# Patient Record
Sex: FEMALE | Race: WHITE | NOT HISPANIC OR LATINO
[De-identification: names, ages, dates, MRNs, and addresses within clinical notes are randomized per-mention and may not be internally consistent; named-entity substitution may affect disease eponyms.]

---

## 2017-03-27 ENCOUNTER — APPOINTMENT (OUTPATIENT)
Dept: INTERNAL MEDICINE | Facility: CLINIC | Age: 57
End: 2017-03-27

## 2017-04-13 ENCOUNTER — APPOINTMENT (OUTPATIENT)
Dept: INTERNAL MEDICINE | Facility: CLINIC | Age: 57
End: 2017-04-13

## 2017-04-13 VITALS
BODY MASS INDEX: 37.69 KG/M2 | DIASTOLIC BLOOD PRESSURE: 82 MMHG | RESPIRATION RATE: 18 BRPM | SYSTOLIC BLOOD PRESSURE: 100 MMHG | WEIGHT: 192 LBS | HEART RATE: 88 BPM | HEIGHT: 60 IN | OXYGEN SATURATION: 95 % | TEMPERATURE: 98 F

## 2017-04-13 RX ORDER — AZITHROMYCIN 250 MG/1
250 TABLET, FILM COATED ORAL
Qty: 6 | Refills: 0 | Status: DISCONTINUED | COMMUNITY
Start: 2016-11-15

## 2017-04-25 ENCOUNTER — MEDICATION RENEWAL (OUTPATIENT)
Age: 57
End: 2017-04-25

## 2017-05-15 ENCOUNTER — RX RENEWAL (OUTPATIENT)
Age: 57
End: 2017-05-15

## 2017-05-23 ENCOUNTER — MEDICATION RENEWAL (OUTPATIENT)
Age: 57
End: 2017-05-23

## 2017-05-23 DIAGNOSIS — M54.5 LOW BACK PAIN: ICD-10-CM

## 2017-07-03 ENCOUNTER — RX RENEWAL (OUTPATIENT)
Age: 57
End: 2017-07-03

## 2017-07-06 ENCOUNTER — MEDICATION RENEWAL (OUTPATIENT)
Age: 57
End: 2017-07-06

## 2017-08-08 ENCOUNTER — RX RENEWAL (OUTPATIENT)
Age: 57
End: 2017-08-08

## 2017-08-21 ENCOUNTER — APPOINTMENT (OUTPATIENT)
Dept: MAMMOGRAPHY | Facility: HOSPITAL | Age: 57
End: 2017-08-21
Payer: MEDICAID

## 2017-08-21 ENCOUNTER — OUTPATIENT (OUTPATIENT)
Dept: OUTPATIENT SERVICES | Facility: HOSPITAL | Age: 57
LOS: 1 days | End: 2017-08-21
Payer: MEDICAID

## 2017-08-21 PROCEDURE — 77067 SCR MAMMO BI INCL CAD: CPT

## 2017-08-21 PROCEDURE — 77063 BREAST TOMOSYNTHESIS BI: CPT | Mod: 26

## 2017-08-21 PROCEDURE — G0202: CPT | Mod: 26

## 2017-08-21 PROCEDURE — 77063 BREAST TOMOSYNTHESIS BI: CPT

## 2017-08-22 ENCOUNTER — RECORD ABSTRACTING (OUTPATIENT)
Age: 57
End: 2017-08-22

## 2017-08-22 DIAGNOSIS — M50.90 CERVICAL DISC DISORDER, UNSPECIFIED, UNSPECIFIED CERVICAL REGION: ICD-10-CM

## 2017-08-22 DIAGNOSIS — F32.9 MAJOR DEPRESSIVE DISORDER, SINGLE EPISODE, UNSPECIFIED: ICD-10-CM

## 2017-08-22 DIAGNOSIS — D56.1 BETA THALASSEMIA: ICD-10-CM

## 2017-08-22 DIAGNOSIS — Z91.89 OTHER SPECIFIED PERSONAL RISK FACTORS, NOT ELSEWHERE CLASSIFIED: ICD-10-CM

## 2017-08-22 DIAGNOSIS — G47.33 OBSTRUCTIVE SLEEP APNEA (ADULT) (PEDIATRIC): ICD-10-CM

## 2017-08-22 DIAGNOSIS — R60.0 LOCALIZED EDEMA: ICD-10-CM

## 2017-08-22 DIAGNOSIS — M54.5 LOW BACK PAIN: ICD-10-CM

## 2017-08-22 DIAGNOSIS — D50.9 IRON DEFICIENCY ANEMIA, UNSPECIFIED: ICD-10-CM

## 2017-08-24 ENCOUNTER — APPOINTMENT (OUTPATIENT)
Dept: INTERNAL MEDICINE | Facility: CLINIC | Age: 57
End: 2017-08-24
Payer: MEDICAID

## 2017-08-24 VITALS
RESPIRATION RATE: 18 BRPM | OXYGEN SATURATION: 97 % | SYSTOLIC BLOOD PRESSURE: 108 MMHG | HEART RATE: 90 BPM | WEIGHT: 192 LBS | HEIGHT: 60 IN | DIASTOLIC BLOOD PRESSURE: 62 MMHG | TEMPERATURE: 98.1 F | BODY MASS INDEX: 37.69 KG/M2

## 2017-08-24 DIAGNOSIS — Z80.1 FAMILY HISTORY OF MALIGNANT NEOPLASM OF TRACHEA, BRONCHUS AND LUNG: ICD-10-CM

## 2017-08-24 DIAGNOSIS — Z82.0 FAMILY HISTORY OF EPILEPSY AND OTHER DISEASES OF THE NERVOUS SYSTEM: ICD-10-CM

## 2017-08-24 DIAGNOSIS — Z83.3 FAMILY HISTORY OF DIABETES MELLITUS: ICD-10-CM

## 2017-08-24 PROCEDURE — 99213 OFFICE O/P EST LOW 20 MIN: CPT

## 2017-08-24 RX ORDER — TIOTROPIUM BROMIDE INHALATION SPRAY 1.56 UG/1
1.25 SPRAY, METERED RESPIRATORY (INHALATION)
Refills: 0 | Status: DISCONTINUED | COMMUNITY
End: 2017-08-24

## 2017-08-31 ENCOUNTER — RX RENEWAL (OUTPATIENT)
Age: 57
End: 2017-08-31

## 2017-09-26 ENCOUNTER — APPOINTMENT (OUTPATIENT)
Dept: INTERNAL MEDICINE | Facility: CLINIC | Age: 57
End: 2017-09-26
Payer: COMMERCIAL

## 2017-09-26 VITALS
WEIGHT: 194 LBS | DIASTOLIC BLOOD PRESSURE: 76 MMHG | SYSTOLIC BLOOD PRESSURE: 102 MMHG | OXYGEN SATURATION: 97 % | RESPIRATION RATE: 18 BRPM | BODY MASS INDEX: 38.09 KG/M2 | HEIGHT: 60 IN | HEART RATE: 110 BPM | TEMPERATURE: 97.8 F

## 2017-09-26 PROCEDURE — 99396 PREV VISIT EST AGE 40-64: CPT

## 2017-09-26 RX ORDER — ATORVASTATIN CALCIUM 10 MG/1
10 TABLET, FILM COATED ORAL DAILY
Qty: 90 | Refills: 1 | Status: DISCONTINUED | COMMUNITY
Start: 2017-04-13 | End: 2017-09-26

## 2017-09-28 ENCOUNTER — RX RENEWAL (OUTPATIENT)
Age: 57
End: 2017-09-28

## 2017-10-13 ENCOUNTER — APPOINTMENT (OUTPATIENT)
Dept: INTERNAL MEDICINE | Facility: CLINIC | Age: 57
End: 2017-10-13
Payer: COMMERCIAL

## 2017-10-13 VITALS
SYSTOLIC BLOOD PRESSURE: 112 MMHG | DIASTOLIC BLOOD PRESSURE: 64 MMHG | OXYGEN SATURATION: 97 % | TEMPERATURE: 98.1 F | BODY MASS INDEX: 38.48 KG/M2 | HEIGHT: 60 IN | RESPIRATION RATE: 18 BRPM | HEART RATE: 107 BPM | WEIGHT: 196 LBS

## 2017-10-13 DIAGNOSIS — H92.09 OTALGIA, UNSPECIFIED EAR: ICD-10-CM

## 2017-10-13 DIAGNOSIS — Z88.9 ALLERGY STATUS TO UNSPECIFIED DRUGS, MEDICAMENTS AND BIOLOGICAL SUBSTANCES: ICD-10-CM

## 2017-10-13 PROCEDURE — 99213 OFFICE O/P EST LOW 20 MIN: CPT

## 2017-10-19 ENCOUNTER — RX RENEWAL (OUTPATIENT)
Age: 57
End: 2017-10-19

## 2017-10-27 ENCOUNTER — MEDICATION RENEWAL (OUTPATIENT)
Age: 57
End: 2017-10-27

## 2018-01-08 ENCOUNTER — RX RENEWAL (OUTPATIENT)
Age: 58
End: 2018-01-08

## 2018-01-12 ENCOUNTER — MEDICATION RENEWAL (OUTPATIENT)
Age: 58
End: 2018-01-12

## 2018-01-18 ENCOUNTER — RX RENEWAL (OUTPATIENT)
Age: 58
End: 2018-01-18

## 2018-02-15 ENCOUNTER — RX RENEWAL (OUTPATIENT)
Age: 58
End: 2018-02-15

## 2018-02-15 ENCOUNTER — APPOINTMENT (OUTPATIENT)
Dept: INTERNAL MEDICINE | Facility: CLINIC | Age: 58
End: 2018-02-15
Payer: COMMERCIAL

## 2018-02-15 ENCOUNTER — RESULT CHARGE (OUTPATIENT)
Age: 58
End: 2018-02-15

## 2018-02-15 VITALS
SYSTOLIC BLOOD PRESSURE: 130 MMHG | HEIGHT: 60 IN | OXYGEN SATURATION: 98 % | HEART RATE: 90 BPM | WEIGHT: 181.99 LBS | DIASTOLIC BLOOD PRESSURE: 80 MMHG | RESPIRATION RATE: 16 BRPM | TEMPERATURE: 98.7 F | BODY MASS INDEX: 35.73 KG/M2

## 2018-02-15 DIAGNOSIS — H60.91 UNSPECIFIED OTITIS EXTERNA, RIGHT EAR: ICD-10-CM

## 2018-02-15 DIAGNOSIS — H92.01 OTALGIA, RIGHT EAR: ICD-10-CM

## 2018-02-15 LAB — GLUCOSE BLDC GLUCOMTR-MCNC: 132

## 2018-02-15 PROCEDURE — 99214 OFFICE O/P EST MOD 30 MIN: CPT | Mod: 25

## 2018-02-15 PROCEDURE — 82962 GLUCOSE BLOOD TEST: CPT

## 2018-02-15 RX ORDER — METHYLPREDNISOLONE 4 MG/1
4 TABLET ORAL
Qty: 1 | Refills: 2 | Status: DISCONTINUED | COMMUNITY
Start: 2017-10-13 | End: 2018-02-15

## 2018-02-15 RX ORDER — LEVOFLOXACIN 500 MG/1
500 TABLET, FILM COATED ORAL DAILY
Qty: 7 | Refills: 0 | Status: DISCONTINUED | COMMUNITY
Start: 2017-10-13 | End: 2018-02-15

## 2018-02-15 RX ORDER — NEOMYCIN SULFATE, POLYMYXIN B SULFATE AND HYDROCORTISONE 3.5; 10000; 1 MG/ML; [IU]/ML; MG/ML
3.5-10000-1 SOLUTION AURICULAR (OTIC)
Qty: 1 | Refills: 1 | Status: DISCONTINUED | COMMUNITY
Start: 2017-10-13 | End: 2018-02-15

## 2018-02-15 RX ORDER — RABEPRAZOLE SODIUM 20 MG/1
20 TABLET, DELAYED RELEASE ORAL
Qty: 90 | Refills: 1 | Status: COMPLETED | COMMUNITY
Start: 2016-10-31 | End: 2018-02-15

## 2018-02-16 ENCOUNTER — MEDICATION RENEWAL (OUTPATIENT)
Age: 58
End: 2018-02-16

## 2018-02-16 RX ORDER — RABEPRAZOLE SODIUM 20 MG/1
20 TABLET, DELAYED RELEASE ORAL
Qty: 90 | Refills: 1 | Status: COMPLETED | COMMUNITY
Start: 2018-02-15 | End: 2018-02-16

## 2018-02-21 ENCOUNTER — MEDICATION RENEWAL (OUTPATIENT)
Age: 58
End: 2018-02-21

## 2018-02-21 RX ORDER — LANSOPRAZOLE 30 MG/1
30 CAPSULE, DELAYED RELEASE ORAL
Qty: 30 | Refills: 5 | Status: COMPLETED | COMMUNITY
Start: 2017-09-28 | End: 2018-02-21

## 2018-03-26 ENCOUNTER — CLINICAL ADVICE (OUTPATIENT)
Age: 58
End: 2018-03-26

## 2018-05-23 ENCOUNTER — RX RENEWAL (OUTPATIENT)
Age: 58
End: 2018-05-23

## 2018-06-04 ENCOUNTER — RX RENEWAL (OUTPATIENT)
Age: 58
End: 2018-06-04

## 2018-08-06 ENCOUNTER — APPOINTMENT (OUTPATIENT)
Dept: INTERNAL MEDICINE | Facility: CLINIC | Age: 58
End: 2018-08-06
Payer: COMMERCIAL

## 2018-08-06 VITALS
OXYGEN SATURATION: 96 % | BODY MASS INDEX: 35.93 KG/M2 | HEIGHT: 60 IN | TEMPERATURE: 98.4 F | WEIGHT: 183 LBS | HEART RATE: 94 BPM | RESPIRATION RATE: 16 BRPM | SYSTOLIC BLOOD PRESSURE: 124 MMHG | DIASTOLIC BLOOD PRESSURE: 64 MMHG

## 2018-08-06 PROCEDURE — 94060 EVALUATION OF WHEEZING: CPT

## 2018-08-06 PROCEDURE — 94729 DIFFUSING CAPACITY: CPT

## 2018-08-06 PROCEDURE — ZZZZZ: CPT

## 2018-08-06 PROCEDURE — 99214 OFFICE O/P EST MOD 30 MIN: CPT | Mod: 25

## 2018-08-06 PROCEDURE — 94727 GAS DIL/WSHOT DETER LNG VOL: CPT

## 2018-08-06 RX ORDER — AZITHROMYCIN 250 MG/1
250 TABLET, FILM COATED ORAL
Qty: 1 | Refills: 0 | Status: DISCONTINUED | COMMUNITY
Start: 2018-03-26 | End: 2018-08-06

## 2018-08-06 NOTE — PHYSICAL EXAM
[General Appearance - Alert] : alert [General Appearance - In No Acute Distress] : in no acute distress [Outer Ear] : the ears and nose were normal in appearance [Neck Appearance] : the appearance of the neck was normal [Neck Cervical Mass (___cm)] : no neck mass was observed [Jugular Venous Distention Increased] : there was no jugular-venous distention [Thyroid Diffuse Enlargement] : the thyroid was not enlarged [Thyroid Nodule] : there were no palpable thyroid nodules [] : no respiratory distress [Heart Rate And Rhythm] : heart rate was normal and rhythm regular [Heart Sounds] : normal S1 and S2 [Heart Sounds Gallop] : no gallops [Murmurs] : no murmurs [Heart Sounds Pericardial Friction Rub] : no pericardial rub [FreeTextEntry1] : Several hyperpigmented nevi are noted

## 2018-08-06 NOTE — PLAN
[FreeTextEntry1] : Pt again advised she must reduce use of ventolin and risks of toxicity/tolerance  reiterated.  This office will not overprescribe albuterol.  She agrees to reduce her use and will restart Symbicort 160/4.5. Importance of maintenance treatment discussed.   If this is not covered we will attempt to work with her insurance Co for a preferred RX.  \par \par She again declines starting statin. CV risks associated with DM reviewed. \par \par Optho evaluation yearly.\par  \par See GYN for pap and mammo.  \par \par Suggested pt contact the [facility] as they may be able to provide her with less costly health care services.  \par \par We had an extensive discussion regarding smoking cessation lasting 5 minutes. We reviewed  the various OTC and prescription options available.  Pt education provided.  Pt was given resources to aid in smoking cessation including the [facility] quitline and [facility]. Pt states she does not wish to quit despite repeated urgings.  \par

## 2018-08-06 NOTE — DATA REVIEWED
[FreeTextEntry1] : PFT was performed today.  Lung volumes are within normal.  FLows are normal but 25-75% 67 % with mild airway reactivity.  The DLCO is normal and saturation is maintained.

## 2018-08-06 NOTE — COUNSELING
[Weight management counseling provided] : Weight management [Healthy eating counseling provided] : healthy eating [Activity counseling provided] : activity [Smoking cessation counseling provided] : smoking cessation [Decrease Portions] : Decrease food portions [___ min/wk activity recommended] : [unfilled] min/wk activity recommended [Quit Smoking] : Quit smoking [Patient Non-adherent to care plan] : Patient non-adherent to care plan [Financial issues] : Financial issues [Patient motivation] : Patient motivation [Needs reinforcement, provided] : Patient needs reinforcement on understanding lifestyle changes and  the steps needed to achieve self management goals and reinforcement was provided

## 2018-08-06 NOTE — HISTORY OF PRESENT ILLNESS
[FreeTextEntry1] : DM/Asthma FU [de-identified] : She continues to use Albuterol MDI 2 inhaler per month despite all prior warnings.    In addition she may use a Albuterol Neb tx every few days for increased  SOB and tightness.  She continues to smoke 2 ppd and states she has no desire to stop smoking.  She has a constant PND.  She stopped flonase as she ran out.  Previous use of ICS/LABAs have caused thrush in the past or "made her asthma worse".      \par She is trying to eat healthier.  She misses meals at times due to work schedule.  No weight loss.  She has not been doing any exercise as she feels she is too busy and  she gets enough exercise cleaning houses.  She did not start statin as previously recommended.  \par

## 2018-08-06 NOTE — REVIEW OF SYSTEMS
[Postnasal Drip] : postnasal drip [Shortness Of Breath] : shortness of breath [Wheezing] : wheezing [Negative] : Musculoskeletal

## 2018-08-16 ENCOUNTER — RX RENEWAL (OUTPATIENT)
Age: 58
End: 2018-08-16

## 2018-08-16 RX ORDER — LANCETS
EACH MISCELLANEOUS
Qty: 90 | Refills: 0 | Status: ACTIVE | COMMUNITY
Start: 2018-08-16 | End: 1900-01-01

## 2018-08-16 RX ORDER — BLOOD SUGAR DIAGNOSTIC
STRIP MISCELLANEOUS
Qty: 90 | Refills: 3 | Status: ACTIVE | COMMUNITY
Start: 2018-08-16 | End: 1900-01-01

## 2018-08-20 ENCOUNTER — RX RENEWAL (OUTPATIENT)
Age: 58
End: 2018-08-20

## 2018-09-10 ENCOUNTER — RX RENEWAL (OUTPATIENT)
Age: 58
End: 2018-09-10

## 2018-09-18 ENCOUNTER — RX RENEWAL (OUTPATIENT)
Age: 58
End: 2018-09-18

## 2018-10-31 LAB
CHOLEST SERPL-MCNC: 199
GLUCOSE BS SERPL-MCNC: 152
HBA1C MFR BLD HPLC: 7.4
HDLC SERPL-MCNC: 45
LIPID PNL WITH DIRECT LDL SERPL: 126
TRIGL SERPL-MCNC: 142

## 2018-11-21 ENCOUNTER — RX RENEWAL (OUTPATIENT)
Age: 58
End: 2018-11-21

## 2018-11-28 ENCOUNTER — RX RENEWAL (OUTPATIENT)
Age: 58
End: 2018-11-28

## 2019-01-04 ENCOUNTER — RX RENEWAL (OUTPATIENT)
Age: 59
End: 2019-01-04

## 2019-01-23 LAB — HBA1C MFR BLD HPLC: 7.3

## 2019-01-28 ENCOUNTER — APPOINTMENT (OUTPATIENT)
Dept: INTERNAL MEDICINE | Facility: CLINIC | Age: 59
End: 2019-01-28
Payer: COMMERCIAL

## 2019-01-28 VITALS
SYSTOLIC BLOOD PRESSURE: 130 MMHG | DIASTOLIC BLOOD PRESSURE: 90 MMHG | HEART RATE: 100 BPM | WEIGHT: 183 LBS | OXYGEN SATURATION: 96 % | BODY MASS INDEX: 35.93 KG/M2 | RESPIRATION RATE: 18 BRPM | TEMPERATURE: 98.7 F | HEIGHT: 60 IN

## 2019-01-28 VITALS — SYSTOLIC BLOOD PRESSURE: 124 MMHG | DIASTOLIC BLOOD PRESSURE: 78 MMHG

## 2019-01-28 PROCEDURE — 99396 PREV VISIT EST AGE 40-64: CPT

## 2019-01-28 PROCEDURE — 99406 BEHAV CHNG SMOKING 3-10 MIN: CPT

## 2019-01-28 PROCEDURE — 99386 PREV VISIT NEW AGE 40-64: CPT

## 2019-01-28 RX ORDER — MELATONIN 3 MG
3 CAPSULE ORAL
Refills: 0 | Status: ACTIVE | COMMUNITY
Start: 2019-01-28

## 2019-01-28 NOTE — ASSESSMENT
[FreeTextEntry1] : \par Repeat BMP to recheck K+.  \par Increase Metformin 1000mg bid.  Monitor BS at home.  \par Patient  should start Vitamin  D 50,000 ius qweek x 8 weeks then OTC vitamin D 2000 ius qd.\par We had an extensive discussion regarding smoking cessation lasting 5 minutes. We reviewed  the various OTC and prescription options available.  Pt education provided.  Pt was given resources to aid in smoking cessation including the  [facility] Tobacco Control. \par \par \par Stressed importance of healthy lifestyle and benefits of weight loss and exercise to reduce BP, improve lipids and BS goals\par Long discussion regarding pathophysiology of DM 2 and treatment modalities.  Reviewed lifestyle modifications with focus on weight loss and exercise. Side effects of medications and complications of DM2 discussed in detail.  Recommended self BGMing and goals provided.  Patient education provided as well as recommendations on web sites and fitness and weight loss APPS to further aide in achieving goals.    All questions were answered and pt reports good understanding.\par Yearly eye exam and flu vaccine advised\par \par FU 6 months with PFT.  Do BW before visit.  \par \par Pap/Mammo \par Smoking cessation again stressed.\par Refusing to start statin.  CVrisks reviewed but declines.  \par \par

## 2019-01-28 NOTE — HISTORY OF PRESENT ILLNESS
[FreeTextEntry1] : CPE  [de-identified] : Here for FU but unfortunately she has not been following a healthy diet and continues to smoke.  She has not been able to lose weight and states she is too busy to exercise.  She does not test her BS.  compliant with Metformin.  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  She still has no desire to quit smoking.  \par \par Last visit to GYN almost 2 years ago.

## 2019-01-28 NOTE — PHYSICAL EXAM

## 2019-01-28 NOTE — ASSESSMENT
[FreeTextEntry1] : \par Repeat BMP to recheck K+.  \par Increase Metformin 1000mg bid.  Monitor BS at home.  \par Patient  should start Vitamin  D 50,000 ius qweek x 8 weeks then OTC vitamin D 2000 ius qd.\par We had an extensive discussion regarding smoking cessation lasting 5 minutes. We reviewed  the various OTC and prescription options available.  Pt education provided.  Pt was given resources to aid in smoking cessation including the  [facility] Tobacco Control. \par \par \par Stressed importance of healthy lifestyle and benefits of weight loss and exercise to reduce BP, improve lipids and BS goals\par Long discussion regarding pathophysiology of DM 2 and treatment modalities.  Reviewed lifestyle modifications with focus on weight loss and exercise. Side effects of medications and complications of DM2 discussed in detail.  Recommended self BGMing and goals provided.  Patient education provided as well as recommendations on web sites and fitness and weight loss APPS to further aide in achieving goals.    All questions were answered and pt reports good understanding.\par Yearly eye exam and flu vaccine advised\par \par FU 6 months with PFT.  Do BW before visit.  \par \par Pap/Mammo \par Smoking cessation again stressed.\par Refusing to start statin.  CVrisks reviewed but declines.  \par \par

## 2019-01-28 NOTE — PHYSICAL EXAM

## 2019-01-28 NOTE — HISTORY OF PRESENT ILLNESS
[FreeTextEntry1] : CPE  [de-identified] : Here for FU but unfortunately she has not been following a healthy diet and continues to smoke.  She has not been able to lose weight and states she is too busy to exercise.  She does not test her BS.  compliant with Metformin.  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  She still has no desire to quit smoking.  \par \par Last visit to GYN almost 2 years ago.

## 2019-01-28 NOTE — COUNSELING
[Weight management counseling provided] : Weight management [Healthy eating counseling provided] : healthy eating [Activity counseling provided] : activity [Smoking cessation counseling provided] : smoking cessation [Keep Food Diary] : Keep food diary [Decrease Portions] : Decrease food portions [Quit Smoking] : Quit smoking [Patient Non-adherent to care plan] : Patient non-adherent to care plan [Financial issues] : Financial issues [Patient motivation] : Patient motivation [Good understanding] : Patient has a good understanding of lifestyle changes and the steps needed to achieve self management goals

## 2019-01-28 NOTE — HEALTH RISK ASSESSMENT
[0] : 2) Feeling down, depressed, or hopeless: Not at all (0) [Employed] : employed [Smoke Detector] : smoke detector [Carbon Monoxide Detector] : carbon monoxide detector [Seat Belt] :  uses seat belt [Sunscreen] : uses sunscreen [Access to Medications] : access to medications [Alone] : lives alone [Single] : single [Fully functional (bathing, dressing, toileting, transferring, walking, feeding)] : Fully functional (bathing, dressing, toileting, transferring, walking, feeding) [Fully functional (using the telephone, shopping, preparing meals, housekeeping, doing laundry, using] : Fully functional and needs no help or supervision to perform IADLs (using the telephone, shopping, preparing meals, housekeeping, doing laundry, using transportation, managing medications and managing finances) [] : No [de-identified] : occasional [Change in mental status noted] : No change in mental status noted [Language] : denies difficulty with language [Reports changes in vision] : Reports no changes in vision [Guns at Home] : no guns at home [MammogramDate] : 2016 [PapSmearDate] : 2016 [FreeTextEntry2] : / [de-identified] : wears readers

## 2019-01-28 NOTE — HEALTH RISK ASSESSMENT
[0] : 2) Feeling down, depressed, or hopeless: Not at all (0) [Employed] : employed [Smoke Detector] : smoke detector [Carbon Monoxide Detector] : carbon monoxide detector [Seat Belt] :  uses seat belt [Sunscreen] : uses sunscreen [Access to Medications] : access to medications [Alone] : lives alone [Single] : single [Fully functional (bathing, dressing, toileting, transferring, walking, feeding)] : Fully functional (bathing, dressing, toileting, transferring, walking, feeding) [Fully functional (using the telephone, shopping, preparing meals, housekeeping, doing laundry, using] : Fully functional and needs no help or supervision to perform IADLs (using the telephone, shopping, preparing meals, housekeeping, doing laundry, using transportation, managing medications and managing finances) [] : No [de-identified] : occasional [Change in mental status noted] : No change in mental status noted [Language] : denies difficulty with language [Reports changes in vision] : Reports no changes in vision [Guns at Home] : no guns at home [MammogramDate] : 2016 [PapSmearDate] : 2016 [FreeTextEntry2] : / [de-identified] : wears readers

## 2019-03-07 ENCOUNTER — RX RENEWAL (OUTPATIENT)
Age: 59
End: 2019-03-07

## 2019-04-09 ENCOUNTER — MEDICATION RENEWAL (OUTPATIENT)
Age: 59
End: 2019-04-09

## 2019-04-10 ENCOUNTER — RX RENEWAL (OUTPATIENT)
Age: 59
End: 2019-04-10

## 2019-04-16 RX ORDER — ALBUTEROL SULFATE 90 UG/1
108 (90 BASE) AEROSOL, METERED RESPIRATORY (INHALATION)
Qty: 1 | Refills: 2 | Status: DISCONTINUED | COMMUNITY
Start: 2016-08-05 | End: 2019-04-16

## 2019-05-06 ENCOUNTER — RX RENEWAL (OUTPATIENT)
Age: 59
End: 2019-05-06

## 2019-05-15 ENCOUNTER — RX RENEWAL (OUTPATIENT)
Age: 59
End: 2019-05-15

## 2019-06-17 ENCOUNTER — RX RENEWAL (OUTPATIENT)
Age: 59
End: 2019-06-17

## 2019-07-15 ENCOUNTER — APPOINTMENT (OUTPATIENT)
Dept: INTERNAL MEDICINE | Facility: CLINIC | Age: 59
End: 2019-07-15

## 2019-07-25 ENCOUNTER — APPOINTMENT (OUTPATIENT)
Dept: INTERNAL MEDICINE | Facility: CLINIC | Age: 59
End: 2019-07-25
Payer: COMMERCIAL

## 2019-07-25 ENCOUNTER — NON-APPOINTMENT (OUTPATIENT)
Age: 59
End: 2019-07-25

## 2019-07-25 VITALS
TEMPERATURE: 98.4 F | RESPIRATION RATE: 16 BRPM | DIASTOLIC BLOOD PRESSURE: 70 MMHG | HEART RATE: 86 BPM | WEIGHT: 182 LBS | HEIGHT: 60 IN | SYSTOLIC BLOOD PRESSURE: 110 MMHG | BODY MASS INDEX: 35.73 KG/M2 | OXYGEN SATURATION: 96 %

## 2019-07-25 DIAGNOSIS — K21.9 GASTRO-ESOPHAGEAL REFLUX DISEASE W/OUT ESOPHAGITIS: ICD-10-CM

## 2019-07-25 DIAGNOSIS — M79.605 PAIN IN LEFT LEG: ICD-10-CM

## 2019-07-25 PROCEDURE — 99214 OFFICE O/P EST MOD 30 MIN: CPT | Mod: 25

## 2019-07-25 PROCEDURE — 93000 ELECTROCARDIOGRAM COMPLETE: CPT

## 2019-07-25 PROCEDURE — 94060 EVALUATION OF WHEEZING: CPT

## 2019-07-25 RX ORDER — INHALER, ASSIST DEVICES
SPACER (EA) MISCELLANEOUS
Qty: 1 | Refills: 0 | Status: ACTIVE | COMMUNITY
Start: 2019-07-25 | End: 1900-01-01

## 2019-07-25 RX ORDER — CHOLECALCIFEROL (VITAMIN D3) 1250 MCG
1.25 MG CAPSULE ORAL
Qty: 8 | Refills: 0 | Status: DISCONTINUED | COMMUNITY
Start: 2019-01-28 | End: 2019-07-25

## 2019-07-25 NOTE — HISTORY OF PRESENT ILLNESS
[FreeTextEntry1] : FU DM/Asthma [de-identified] : Presents for Fu.  Overall she is feeling well.  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   Having pain to L leg several months.- moves from her shin, knee or thigh.  Intermittent pain with weight bearing.  Feels aching sensation, no numbness or weakness.  No LBP.  No trauma but feels may be due to position sitting in school bus.  \jacqui Had diarrhea with increase in  Metformin and reduced dose to taking 5oo mg am and 1000mg in pm.  Not  compliant with diet.   Eating more carbs including   ice cream and cake. Not eating veggies -I'm too lazy.  She is not monitoring BS. 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  . She is using rescue inhaler on most days. Compliant with Symbicort.  Denies cough or chest pain \par She continues to smoke.  No desire to quit\jacqui Has declined statins. Aware of risks especially with DM. \jacqui Refuses  colonoscopy - never did FIT\jacqui Just saw GYN for pap.  Mammo to be scheduled.

## 2019-07-25 NOTE — DATA REVIEWED
[FreeTextEntry1] : pre/post spirometry shows normal flows without significant airway reactivity\par \par \par EKG NSR 80s, nml axis no acute ST changes.  \par \par Pt did not do BW for visit.

## 2019-07-25 NOTE — COUNSELING
[Weight management counseling provided] : Weight management [Healthy eating counseling provided] : healthy eating [Activity counseling provided] : activity [Decrease Portions] : Decrease food portions [Keep Food Diary] : Keep food diary [Patient Non-adherent to care plan] : Patient non-adherent to care plan [Financial issues] : Financial issues [Patient motivation] : Patient motivation [Discussed Risks and Advised to Quit Smoking] : Discussed risks and advised to quit smoking [___ min/wk activity recommended] : [unfilled] min/wk activity recommended

## 2019-07-25 NOTE — PHYSICAL EXAM

## 2019-07-25 NOTE — ASSESSMENT
[FreeTextEntry1] : \par \par BW now at [facility]- A1C , bmp, urine microalbumin.  \par \par Continue Metformin at current dose for now until review of todays BW.  Will likely need adjustment in regimen.  \par \par Pt continues to decline use of statin and has been noncompliant with exercise,   diet or weight loss.  Wishes to hold testing at this time.  \par \par FIT testing advised yearly as she refuses Colonoscopy for screening.  \par \par Do Mammo\par \par Long discussion focusing on benefits of  Lifestyle modifications.  Encouraged healthy diet with lean meats, fish, chicken,increased fruits and veggies, higher fiber and avoidance of sweets, soft drinks and fruit juices.   Discussed  portion control  and importance of  increased exercise for health promotion and disease prevention. Calorie restriction with 500 -1000 brandan necessary to promote weight loss of 1-2 lbs weekly.\par \par Smoking cessation again stressed.   \par \par Xrays of L LE.  Pending results consider Ortho evaluation. \par \par Yearly eye exam.  \par \par FU 6 months for CPE.  Do FBW prior to visit.  \par \par \par

## 2019-07-29 LAB
ALBUMIN SERPL ELPH-MCNC: 4.9 G/DL
ALP BLD-CCNC: 87 U/L
ALT SERPL-CCNC: 13 U/L
ANION GAP SERPL CALC-SCNC: 13 MMOL/L
AST SERPL-CCNC: 11 U/L
BILIRUB DIRECT SERPL-MCNC: 0.1 MG/DL
BILIRUB INDIRECT SERPL-MCNC: 0.1 MG/DL
BILIRUB SERPL-MCNC: 0.2 MG/DL
BUN SERPL-MCNC: 17 MG/DL
CALCIUM SERPL-MCNC: 10.3 MG/DL
CHLORIDE SERPL-SCNC: 105 MMOL/L
CHOLEST SERPL-MCNC: 221 MG/DL
CHOLEST/HDLC SERPL: 5.1 RATIO
CO2 SERPL-SCNC: 23 MMOL/L
CREAT SERPL-MCNC: 0.81 MG/DL
CREAT SPEC-SCNC: 167 MG/DL
ESTIMATED AVERAGE GLUCOSE: 157 MG/DL
GLUCOSE SERPL-MCNC: 106 MG/DL
HBA1C MFR BLD HPLC: 7.1 %
HDLC SERPL-MCNC: 43 MG/DL
LDLC SERPL CALC-MCNC: 142 MG/DL
MICROALBUMIN 24H UR DL<=1MG/L-MCNC: <1.2 MG/DL
MICROALBUMIN/CREAT 24H UR-RTO: NORMAL MG/G
POTASSIUM SERPL-SCNC: 5 MMOL/L
PROT SERPL-MCNC: 7.2 G/DL
SODIUM SERPL-SCNC: 141 MMOL/L
TRIGL SERPL-MCNC: 179 MG/DL

## 2019-07-31 ENCOUNTER — CLINICAL ADVICE (OUTPATIENT)
Age: 59
End: 2019-07-31

## 2019-08-05 ENCOUNTER — RX RENEWAL (OUTPATIENT)
Age: 59
End: 2019-08-05

## 2019-08-12 ENCOUNTER — RX RENEWAL (OUTPATIENT)
Age: 59
End: 2019-08-12

## 2019-08-19 ENCOUNTER — NON-APPOINTMENT (OUTPATIENT)
Age: 59
End: 2019-08-19

## 2019-08-19 ENCOUNTER — APPOINTMENT (OUTPATIENT)
Dept: INTERNAL MEDICINE | Facility: CLINIC | Age: 59
End: 2019-08-19
Payer: COMMERCIAL

## 2019-08-19 VITALS
HEART RATE: 80 BPM | WEIGHT: 181.99 LBS | TEMPERATURE: 98.2 F | OXYGEN SATURATION: 96 % | BODY MASS INDEX: 35.73 KG/M2 | RESPIRATION RATE: 18 BRPM | HEIGHT: 60 IN | SYSTOLIC BLOOD PRESSURE: 122 MMHG | DIASTOLIC BLOOD PRESSURE: 70 MMHG

## 2019-08-19 PROCEDURE — 99214 OFFICE O/P EST MOD 30 MIN: CPT | Mod: 25

## 2019-08-19 PROCEDURE — 94060 EVALUATION OF WHEEZING: CPT

## 2019-08-19 NOTE — HISTORY OF PRESENT ILLNESS
[FreeTextEntry8] : URI\par Developed cough and chest congestion without wheeze.  + SOB. No fevers or nasal congestion.  No self treatment.  Compliant with Symbicort.  Continues to smoke.  No thoughts of quitting.

## 2019-08-19 NOTE — PHYSICAL EXAM
[No Accessory Muscle Use] : no accessory muscle use [No Respiratory Distress] : no respiratory distress  [No Edema] : there was no peripheral edema [Normal] : affect was normal and insight and judgment were intact [No Extremity Clubbing/Cyanosis] : no extremity clubbing/cyanosis [de-identified] : Wheeze on forced expiration

## 2019-08-19 NOTE — ASSESSMENT
[FreeTextEntry1] : \par Start Biaxen and Prednisone taper\par Smoking cessation again advised.  PT not interested in quitting.\par Monitor BS and strict low carb diet while on steroids.  \par Call if not improving.

## 2019-09-17 ENCOUNTER — MEDICATION RENEWAL (OUTPATIENT)
Age: 59
End: 2019-09-17

## 2019-10-03 ENCOUNTER — MEDICATION RENEWAL (OUTPATIENT)
Age: 59
End: 2019-10-03

## 2019-10-03 RX ORDER — BUDESONIDE AND FORMOTEROL FUMARATE DIHYDRATE 160; 4.5 UG/1; UG/1
160-4.5 AEROSOL RESPIRATORY (INHALATION) TWICE DAILY
Qty: 1 | Refills: 5 | Status: DISCONTINUED | COMMUNITY
Start: 2018-08-06 | End: 2019-10-03

## 2019-10-08 ENCOUNTER — RX RENEWAL (OUTPATIENT)
Age: 59
End: 2019-10-08

## 2019-10-08 ENCOUNTER — RX CHANGE (OUTPATIENT)
Age: 59
End: 2019-10-08

## 2019-11-13 ENCOUNTER — MEDICATION RENEWAL (OUTPATIENT)
Age: 59
End: 2019-11-13

## 2019-11-13 RX ORDER — FLUTICASONE PROPIONATE AND SALMETEROL 113; 14 UG/1; UG/1
113-14 POWDER, METERED RESPIRATORY (INHALATION) TWICE DAILY
Qty: 3 | Refills: 1 | Status: DISCONTINUED | COMMUNITY
Start: 2019-10-03 | End: 2019-11-13

## 2019-11-26 ENCOUNTER — RX RENEWAL (OUTPATIENT)
Age: 59
End: 2019-11-26

## 2019-12-02 ENCOUNTER — TRANSCRIPTION ENCOUNTER (OUTPATIENT)
Age: 59
End: 2019-12-02

## 2019-12-31 ENCOUNTER — MEDICATION RENEWAL (OUTPATIENT)
Age: 59
End: 2019-12-31

## 2020-01-02 ENCOUNTER — RX RENEWAL (OUTPATIENT)
Age: 60
End: 2020-01-02

## 2020-01-11 ENCOUNTER — TRANSCRIPTION ENCOUNTER (OUTPATIENT)
Age: 60
End: 2020-01-11

## 2020-01-16 ENCOUNTER — APPOINTMENT (OUTPATIENT)
Dept: INTERNAL MEDICINE | Facility: CLINIC | Age: 60
End: 2020-01-16
Payer: COMMERCIAL

## 2020-01-16 ENCOUNTER — LABORATORY RESULT (OUTPATIENT)
Age: 60
End: 2020-01-16

## 2020-01-16 VITALS
WEIGHT: 181 LBS | HEIGHT: 60 IN | RESPIRATION RATE: 18 BRPM | DIASTOLIC BLOOD PRESSURE: 84 MMHG | BODY MASS INDEX: 35.53 KG/M2 | SYSTOLIC BLOOD PRESSURE: 128 MMHG | TEMPERATURE: 98.7 F | OXYGEN SATURATION: 95 %

## 2020-01-16 DIAGNOSIS — J06.9 ACUTE UPPER RESPIRATORY INFECTION, UNSPECIFIED: ICD-10-CM

## 2020-01-16 LAB — GLUCOSE BLDC GLUCOMTR-MCNC: 146

## 2020-01-16 PROCEDURE — 99406 BEHAV CHNG SMOKING 3-10 MIN: CPT

## 2020-01-16 PROCEDURE — 99213 OFFICE O/P EST LOW 20 MIN: CPT | Mod: 25

## 2020-01-16 PROCEDURE — 99396 PREV VISIT EST AGE 40-64: CPT | Mod: 25

## 2020-01-16 PROCEDURE — 82962 GLUCOSE BLOOD TEST: CPT

## 2020-01-16 RX ORDER — CLARITHROMYCIN 500 MG/1
500 TABLET, FILM COATED ORAL
Qty: 14 | Refills: 0 | Status: DISCONTINUED | COMMUNITY
Start: 2019-08-19 | End: 2020-01-16

## 2020-01-16 RX ORDER — PREDNISONE 20 MG/1
20 TABLET ORAL
Qty: 9 | Refills: 0 | Status: DISCONTINUED | COMMUNITY
Start: 2019-08-19 | End: 2020-01-16

## 2020-01-16 RX ORDER — LEVOFLOXACIN 500 MG/1
500 TABLET, FILM COATED ORAL DAILY
Qty: 7 | Refills: 0 | Status: COMPLETED | COMMUNITY
Start: 2020-01-16 | End: 2020-01-23

## 2020-01-16 NOTE — PHYSICAL EXAM
[Normal Outer Ear/Nose] : the outer ears and nose were normal in appearance [Normal Oropharynx] : the oropharynx was normal [Normal TMs] : both tympanic membranes were normal [No Respiratory Distress] : no respiratory distress  [No Accessory Muscle Use] : no accessory muscle use [No Edema] : there was no peripheral edema [No Extremity Clubbing/Cyanosis] : no extremity clubbing/cyanosis [Normal] : affect was normal and insight and judgment were intact [de-identified] : Crackles L base with expiratory wheezes BL

## 2020-01-16 NOTE — HISTORY OF PRESENT ILLNESS
[de-identified] : She is not paying much  attention to her diet but tries to limit sweets and junk food.  No weight loss.  No exercise but she is active house cleaning. Treated at  1/11 for flu with +swab. No Tamiflu.   Fevers subsided but she still has cough and wheeze  Self treated with Prednisone 20 mg-took total of 4 doses over 3 days which did help.   She is using albuterol nebs 2-4 times daily past few days.  Reports compliance with Advair but frequently uses rescue inhaler as she feels Advair not as effective as Symbicort.  \par Treated at  for flu with +swab.  Fevers subsided but she still has cough and wheeze\par She had PAP/mammo done.   \par Did not do FIT test yet. continue to refuse colonoscopy.  \par She continues to smoke and unmotivated to quit. \par She does not monitor her BS.  Had BW done today-pending.    [FreeTextEntry1] : CPE/cough

## 2020-01-16 NOTE — ASSESSMENT
[FreeTextEntry1] : \par do CXR\par Start Levaquin 500 mg qd x 7 days\par Prednisone 20 mg bid x 7 days with food.\par All questions were answered.  \par We discussed the nature and course of this condition. \par Reviewed symptoms to merit ED evaluation.\par Call with any concerns.\par \par Await results of FBW.  \par Discussed current recommendations regarding exercise for general health and well being and for prevention/progression of chronic disease with goal of 30 minutes of moderate intensity activity most days of week.  For weight lose may require 60 - 90 minutes of moderate intensity exercise.\par \par We had an extensive discussion regarding smoking cessation lasting 5 minutes. We reviewed  the various OTC and prescription options available.  Pt education provided.  Pt was given resources to aid in smoking cessation including the [facility] quitline and [facility] as well as [facility] Tobacco Control.\par \par \par FU 6 months.  \par DM eye exam stressed.\par Cardiology eval due to numerous CVrisks.  \par I discussed Lung Ca screening.  She will consider in future.

## 2020-01-16 NOTE — REVIEW OF SYSTEMS
[Patient Intake Form Reviewed] : Patient intake form was reviewed [Postnasal Drip] : postnasal drip [Joint Pain] : joint pain [Muscle Pain] : muscle pain [Chest Pain] : no chest pain

## 2020-01-16 NOTE — COUNSELING
[Cessation strategies including cessation program discussed] : Cessation strategies including cessation program discussed [Risk of tobacco use and health benefits of smoking cessation discussed] : Risk of tobacco use and health benefits of smoking cessation discussed [Tobacco Use Cessation Intermediate Greater Than 3 Minutes Up to 10 Minutes] : Tobacco Use Cessation Intermediate Greater Than 3 Minutes Up to 10 Minutes [Benefits of weight loss discussed] : Benefits of weight loss discussed [Potential consequences of obesity discussed] : Potential consequences of obesity discussed [Encouraged to increase physical activity] : Encouraged to increase physical activity [Needs reinforcement, provided] : Patient needs reinforcement on understanding of disease, goals and obesity follow-up plan; reinforcement was provided [FreeTextEntry1] : 4 [Willing to Quit Smoking] : Not willing to quit smoking

## 2020-01-16 NOTE — HEALTH RISK ASSESSMENT
[30 or more] : 30 or more [] : Yes [Yes] : Yes [1 or 2 (0 pts)] : 1 or 2 (0 points) [0] : 2) Feeling down, depressed, or hopeless: Not at all (0) [Smoke Detector] : smoke detector [Employed] : employed [Carbon Monoxide Detector] : carbon monoxide detector [Safety elements used in home] : safety elements used in home [Sunscreen] : uses sunscreen [Seat Belt] :  uses seat belt [Intercurrent Urgi Care visits] : went to urgent care [de-identified] : occasional [MammogramDate] : 08/01/2019 [Guns at Home] : no guns at home [FreeTextEntry2] :  [PapSmearDate] : 08/01/2019

## 2020-01-18 ENCOUNTER — APPOINTMENT (OUTPATIENT)
Dept: RADIOLOGY | Facility: HOSPITAL | Age: 60
End: 2020-01-18
Payer: COMMERCIAL

## 2020-01-18 ENCOUNTER — OUTPATIENT (OUTPATIENT)
Dept: OUTPATIENT SERVICES | Facility: HOSPITAL | Age: 60
LOS: 1 days | End: 2020-01-18
Payer: MEDICAID

## 2020-01-18 DIAGNOSIS — Z00.8 ENCOUNTER FOR OTHER GENERAL EXAMINATION: ICD-10-CM

## 2020-01-18 PROCEDURE — 71046 X-RAY EXAM CHEST 2 VIEWS: CPT | Mod: 26

## 2020-01-18 PROCEDURE — 71046 X-RAY EXAM CHEST 2 VIEWS: CPT

## 2020-01-21 LAB
25(OH)D3 SERPL-MCNC: 35.6 NG/ML
ALBUMIN SERPL ELPH-MCNC: 4.6 G/DL
ALP BLD-CCNC: 78 U/L
ALT SERPL-CCNC: 29 U/L
ANION GAP SERPL CALC-SCNC: 16 MMOL/L
APPEARANCE: CLEAR
AST SERPL-CCNC: 31 U/L
BACTERIA: NEGATIVE
BASOPHILS # BLD AUTO: 0.04 K/UL
BASOPHILS NFR BLD AUTO: 0.8 %
BILIRUB SERPL-MCNC: 0.2 MG/DL
BILIRUBIN URINE: NEGATIVE
BLOOD URINE: NEGATIVE
BUN SERPL-MCNC: 14 MG/DL
CALCIUM SERPL-MCNC: 9.9 MG/DL
CHLORIDE SERPL-SCNC: 100 MMOL/L
CHOLEST SERPL-MCNC: 173 MG/DL
CHOLEST/HDLC SERPL: 4.6 RATIO
CO2 SERPL-SCNC: 22 MMOL/L
COLOR: YELLOW
CREAT SERPL-MCNC: 0.76 MG/DL
EOSINOPHIL # BLD AUTO: 0.03 K/UL
EOSINOPHIL NFR BLD AUTO: 0.6 %
ESTIMATED AVERAGE GLUCOSE: 166 MG/DL
FOLATE SERPL-MCNC: 13.2 NG/ML
GLUCOSE QUALITATIVE U: NEGATIVE
GLUCOSE SERPL-MCNC: 155 MG/DL
HBA1C MFR BLD HPLC: 7.4 %
HCT VFR BLD CALC: 43.4 %
HDLC SERPL-MCNC: 38 MG/DL
HGB BLD-MCNC: 13.2 G/DL
HYALINE CASTS: 1 /LPF
IMM GRANULOCYTES NFR BLD AUTO: 0.4 %
KETONES URINE: NEGATIVE
LDLC SERPL CALC-MCNC: 106 MG/DL
LEUKOCYTE ESTERASE URINE: NEGATIVE
LYMPHOCYTES # BLD AUTO: 3.44 K/UL
LYMPHOCYTES NFR BLD AUTO: 65.9 %
MAN DIFF?: NORMAL
MCHC RBC-ENTMCNC: 23.7 PG
MCHC RBC-ENTMCNC: 30.4 GM/DL
MCV RBC AUTO: 78.1 FL
MICROSCOPIC-UA: NORMAL
MONOCYTES # BLD AUTO: 0.45 K/UL
MONOCYTES NFR BLD AUTO: 8.6 %
NEUTROPHILS # BLD AUTO: 1.24 K/UL
NEUTROPHILS NFR BLD AUTO: 23.7 %
NITRITE URINE: NEGATIVE
PH URINE: 5.5
PLATELET # BLD AUTO: 362 K/UL
POTASSIUM SERPL-SCNC: 4.7 MMOL/L
PROT SERPL-MCNC: 7.1 G/DL
PROTEIN URINE: NORMAL
RBC # BLD: 5.56 M/UL
RBC # FLD: 15.4 %
RED BLOOD CELLS URINE: 1 /HPF
SODIUM SERPL-SCNC: 139 MMOL/L
SPECIFIC GRAVITY URINE: 1.03
SQUAMOUS EPITHELIAL CELLS: 1 /HPF
TRIGL SERPL-MCNC: 149 MG/DL
TSH SERPL-ACNC: 0.6 UIU/ML
UROBILINOGEN URINE: NORMAL
VIT B12 SERPL-MCNC: 522 PG/ML
WBC # FLD AUTO: 5.22 K/UL
WHITE BLOOD CELLS URINE: 3 /HPF

## 2020-01-27 LAB
25(OH)D3 SERPL-MCNC: 33.4 NG/ML
ALBUMIN SERPL ELPH-MCNC: 4.8 G/DL
ALP BLD-CCNC: 81 U/L
ALT SERPL-CCNC: 31 U/L
ANION GAP SERPL CALC-SCNC: 26 MMOL/L
AST SERPL-CCNC: 33 U/L
BILIRUB SERPL-MCNC: <0.2 MG/DL
BUN SERPL-MCNC: 14 MG/DL
CALCIUM SERPL-MCNC: 10.1 MG/DL
CHLORIDE SERPL-SCNC: 102 MMOL/L
CHOLEST SERPL-MCNC: 187 MG/DL
CHOLEST/HDLC SERPL: 5 RATIO
CO2 SERPL-SCNC: 15 MMOL/L
CREAT SERPL-MCNC: 0.78 MG/DL
FERRITIN SERPL-MCNC: 44 NG/ML
GLUCOSE SERPL-MCNC: 159 MG/DL
HDLC SERPL-MCNC: 38 MG/DL
IRON SERPL-MCNC: 74 UG/DL
LDLC SERPL CALC-MCNC: 119 MG/DL
POTASSIUM SERPL-SCNC: 4.9 MMOL/L
PROT SERPL-MCNC: 7.3 G/DL
SODIUM SERPL-SCNC: 142 MMOL/L
TRANSFERRIN SERPL-MCNC: 311 MG/DL
TRIGL SERPL-MCNC: 156 MG/DL
TSH SERPL-ACNC: 0.6 UIU/ML

## 2020-01-28 ENCOUNTER — RX RENEWAL (OUTPATIENT)
Age: 60
End: 2020-01-28

## 2020-02-03 ENCOUNTER — RX RENEWAL (OUTPATIENT)
Age: 60
End: 2020-02-03

## 2020-03-10 ENCOUNTER — RX RENEWAL (OUTPATIENT)
Age: 60
End: 2020-03-10

## 2020-03-27 ENCOUNTER — RX RENEWAL (OUTPATIENT)
Age: 60
End: 2020-03-27

## 2020-04-27 ENCOUNTER — RX RENEWAL (OUTPATIENT)
Age: 60
End: 2020-04-27

## 2020-05-21 ENCOUNTER — RX RENEWAL (OUTPATIENT)
Age: 60
End: 2020-05-21

## 2020-06-10 ENCOUNTER — TRANSCRIPTION ENCOUNTER (OUTPATIENT)
Age: 60
End: 2020-06-10

## 2020-06-22 ENCOUNTER — APPOINTMENT (OUTPATIENT)
Dept: INTERNAL MEDICINE | Facility: CLINIC | Age: 60
End: 2020-06-22
Payer: COMMERCIAL

## 2020-06-22 VITALS
SYSTOLIC BLOOD PRESSURE: 126 MMHG | TEMPERATURE: 98.6 F | OXYGEN SATURATION: 97 % | HEIGHT: 59 IN | BODY MASS INDEX: 37.5 KG/M2 | RESPIRATION RATE: 16 BRPM | DIASTOLIC BLOOD PRESSURE: 70 MMHG | HEART RATE: 90 BPM | WEIGHT: 186 LBS

## 2020-06-22 PROCEDURE — 99214 OFFICE O/P EST MOD 30 MIN: CPT

## 2020-06-22 RX ORDER — PREDNISONE 20 MG/1
20 TABLET ORAL
Qty: 6 | Refills: 0 | Status: DISCONTINUED | COMMUNITY
Start: 2020-01-21 | End: 2020-06-22

## 2020-06-22 RX ORDER — PREDNISONE 20 MG/1
20 TABLET ORAL TWICE DAILY
Qty: 14 | Refills: 0 | Status: DISCONTINUED | COMMUNITY
Start: 2020-01-16 | End: 2020-06-22

## 2020-06-22 NOTE — COUNSELING
[Risk of tobacco use and health benefits of smoking cessation discussed] : Risk of tobacco use and health benefits of smoking cessation discussed [Potential consequences of obesity discussed] : Potential consequences of obesity discussed [Benefits of weight loss discussed] : Benefits of weight loss discussed [____ min/wk Activity] : [unfilled] min/wk activity [Encouraged to increase physical activity] : Encouraged to increase physical activity [Patient Non-adherent to care plan] : Patient non-adherent to care plan [Needs reinforcement, provided] : Patient needs reinforcement on understanding of lifestyle changes and steps needed to achieve self management goal; reinforcement was provided [Willing to Quit Smoking] : Not willing to quit smoking

## 2020-06-22 NOTE — ASSESSMENT
[FreeTextEntry1] : Do BW.  She may require add on medication if aA1C not at goal.   \par \par Colonoscopy again stressed. She is refusing and is aware of risks. \par \par Refusing statin use. \par \par Healthy diet and exercise stressed.  \par \par Discussed Shingrix vaccination.  Outlined benefits and risks and current recommendation.  \par He will consider and obtain vaccination at his local pharmacy.\par \par \par \par We had an extensive discussion regarding smoking cessation.  We reviewed  the various OTC and prescription options available.  Pt education provided.  Pt was given resources to aid in smoking cessation including the [facility] quitline and [facility] as well as [facility] Tobacco Control.  \par \par FU 6 months for CPE. \par

## 2020-06-22 NOTE — HISTORY OF PRESENT ILLNESS
[FreeTextEntry1] : FU Asthma/smoking/DM [de-identified] : She  has been doing fairly well.  She had been sheltering in place for the most part.  MOre asthma symptoms recently with the hotter weather and increased mildew. She uses her rescue inhaler frequently as she is exposed to chemical during house cleaning.  She does wear a mask.   She gained 5 lbs -"I like my ice cream and cookies" but tries to have smaller portions.  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    She continues to smoke and doesn't want to quit.  \par

## 2020-06-25 LAB
ANION GAP SERPL CALC-SCNC: 14 MMOL/L
BUN SERPL-MCNC: 18 MG/DL
CALCIUM SERPL-MCNC: 10.5 MG/DL
CHLORIDE SERPL-SCNC: 101 MMOL/L
CO2 SERPL-SCNC: 23 MMOL/L
CREAT SERPL-MCNC: 0.86 MG/DL
ESTIMATED AVERAGE GLUCOSE: 163 MG/DL
GLUCOSE SERPL-MCNC: 125 MG/DL
HBA1C MFR BLD HPLC: 7.3 %
POTASSIUM SERPL-SCNC: 5.4 MMOL/L
SARS-COV-2 IGG SERPL IA-ACNC: <0.1 INDEX
SARS-COV-2 IGG SERPL QL IA: NEGATIVE
SODIUM SERPL-SCNC: 138 MMOL/L

## 2020-07-09 ENCOUNTER — RX RENEWAL (OUTPATIENT)
Age: 60
End: 2020-07-09

## 2020-07-23 ENCOUNTER — RX RENEWAL (OUTPATIENT)
Age: 60
End: 2020-07-23

## 2020-07-29 ENCOUNTER — RX RENEWAL (OUTPATIENT)
Age: 60
End: 2020-07-29

## 2020-08-31 ENCOUNTER — RX RENEWAL (OUTPATIENT)
Age: 60
End: 2020-08-31

## 2020-09-10 ENCOUNTER — RX RENEWAL (OUTPATIENT)
Age: 60
End: 2020-09-10

## 2020-10-08 ENCOUNTER — RX RENEWAL (OUTPATIENT)
Age: 60
End: 2020-10-08

## 2020-11-11 ENCOUNTER — RX RENEWAL (OUTPATIENT)
Age: 60
End: 2020-11-11

## 2020-12-07 ENCOUNTER — APPOINTMENT (OUTPATIENT)
Dept: INTERNAL MEDICINE | Facility: CLINIC | Age: 60
End: 2020-12-07
Payer: COMMERCIAL

## 2020-12-07 VITALS
HEART RATE: 82 BPM | TEMPERATURE: 97.3 F | HEIGHT: 59 IN | SYSTOLIC BLOOD PRESSURE: 120 MMHG | WEIGHT: 186 LBS | DIASTOLIC BLOOD PRESSURE: 70 MMHG | RESPIRATION RATE: 16 BRPM | OXYGEN SATURATION: 97 % | BODY MASS INDEX: 37.5 KG/M2

## 2020-12-07 PROCEDURE — 99214 OFFICE O/P EST MOD 30 MIN: CPT

## 2020-12-07 PROCEDURE — 99072 ADDL SUPL MATRL&STAF TM PHE: CPT

## 2020-12-07 RX ORDER — ALBUTEROL SULFATE 90 UG/1
108 (90 BASE) INHALANT RESPIRATORY (INHALATION)
Qty: 1 | Refills: 1 | Status: DISCONTINUED | COMMUNITY
Start: 2019-04-10 | End: 2020-12-07

## 2020-12-07 NOTE — HISTORY OF PRESENT ILLNESS
[FreeTextEntry1] : FU DM/HLD [de-identified] : She has been doing fairly well.  Unfortunately She continues to smoke and remains non-adherent to dietary and exercise recommendations.  Several family members were ill with COVID and are recovery.    \par She did not increase Metformin to 1000mg bid. Taking 1500mg qd.  Does not SMBG.\par Her asthma has been under better control which she attributes to wearing her mask.  \par Declines cardiology evaluation.

## 2020-12-07 NOTE — ASSESSMENT
[FreeTextEntry1] : Yearly eye exam advised\par \par A1C, BMP today. Stool FIT testing again advised. Meds adjusted after results.  \par \par We had an extensive discussion regarding smoking cessation lasting 5 minutes. Pt education provided.  Pt was given resources to aid in smoking cessation including the [facility] quitline and [facility]. \par \par Refusing statins.\par \par \par CPE 6 months.  Do FBW 1 week prior \par \par Compliance with diet, exercise and smoking cessation.

## 2020-12-09 LAB
ANION GAP SERPL CALC-SCNC: 13 MMOL/L
BUN SERPL-MCNC: 14 MG/DL
CALCIUM SERPL-MCNC: 10.1 MG/DL
CHLORIDE SERPL-SCNC: 102 MMOL/L
CO2 SERPL-SCNC: 23 MMOL/L
CREAT SERPL-MCNC: 0.87 MG/DL
ESTIMATED AVERAGE GLUCOSE: 169 MG/DL
GLUCOSE SERPL-MCNC: 139 MG/DL
HBA1C MFR BLD HPLC: 7.5 %
POTASSIUM SERPL-SCNC: 4.9 MMOL/L
SODIUM SERPL-SCNC: 139 MMOL/L

## 2020-12-23 PROBLEM — J06.9 UPPER RESPIRATORY INFECTION, ACUTE: Status: RESOLVED | Noted: 2018-03-26 | Resolved: 2020-12-23

## 2021-01-10 ENCOUNTER — TRANSCRIPTION ENCOUNTER (OUTPATIENT)
Age: 61
End: 2021-01-10

## 2021-01-28 ENCOUNTER — RX RENEWAL (OUTPATIENT)
Age: 61
End: 2021-01-28

## 2021-02-07 ENCOUNTER — RX RENEWAL (OUTPATIENT)
Age: 61
End: 2021-02-07

## 2021-03-02 ENCOUNTER — NON-APPOINTMENT (OUTPATIENT)
Age: 61
End: 2021-03-02

## 2021-03-31 ENCOUNTER — RX RENEWAL (OUTPATIENT)
Age: 61
End: 2021-03-31

## 2021-04-08 ENCOUNTER — APPOINTMENT (OUTPATIENT)
Dept: PEDIATRIC ALLERGY IMMUNOLOGY | Facility: CLINIC | Age: 61
End: 2021-04-08

## 2021-05-18 ENCOUNTER — LABORATORY RESULT (OUTPATIENT)
Age: 61
End: 2021-05-18

## 2021-05-18 ENCOUNTER — APPOINTMENT (OUTPATIENT)
Dept: INTERNAL MEDICINE | Facility: CLINIC | Age: 61
End: 2021-05-18
Payer: COMMERCIAL

## 2021-05-18 ENCOUNTER — NON-APPOINTMENT (OUTPATIENT)
Age: 61
End: 2021-05-18

## 2021-05-18 VITALS
HEART RATE: 84 BPM | OXYGEN SATURATION: 97 % | DIASTOLIC BLOOD PRESSURE: 78 MMHG | TEMPERATURE: 96.1 F | RESPIRATION RATE: 18 BRPM | HEIGHT: 59 IN | BODY MASS INDEX: 35.88 KG/M2 | WEIGHT: 178 LBS | SYSTOLIC BLOOD PRESSURE: 138 MMHG

## 2021-05-18 DIAGNOSIS — Z87.828 PERSONAL HISTORY OF OTHER (HEALED) PHYSICAL INJURY AND TRAUMA: ICD-10-CM

## 2021-05-18 DIAGNOSIS — Z00.00 ENCOUNTER FOR GENERAL ADULT MEDICAL EXAMINATION W/OUT ABNORMAL FINDINGS: ICD-10-CM

## 2021-05-18 PROCEDURE — 99396 PREV VISIT EST AGE 40-64: CPT | Mod: 25

## 2021-05-18 PROCEDURE — 93000 ELECTROCARDIOGRAM COMPLETE: CPT

## 2021-05-18 RX ORDER — FLUTICASONE PROPIONATE AND SALMETEROL 250; 50 UG/1; UG/1
250-50 POWDER RESPIRATORY (INHALATION)
Qty: 3 | Refills: 1 | Status: DISCONTINUED | COMMUNITY
Start: 2019-11-13 | End: 2021-05-18

## 2021-05-18 NOTE — COUNSELING
[Risk of tobacco use and health benefits of smoking cessation discussed] : Risk of tobacco use and health benefits of smoking cessation discussed [Potential consequences of obesity discussed] : Potential consequences of obesity discussed [Benefits of weight loss discussed] : Benefits of weight loss discussed [Encouraged to increase physical activity] : Encouraged to increase physical activity [Patient Non-adherent to care plan] : Patient non-adherent to care plan [Patient motivation] : Patient motivation [Needs reinforcement, provided] : Patient needs reinforcement on understanding of lifestyle changes and steps needed to achieve self management goal; reinforcement was provided

## 2021-05-18 NOTE — PHYSICAL EXAM
[Normal TMs] : both tympanic membranes were normal [No Edema] : there was no peripheral edema [No Extremity Clubbing/Cyanosis] : no extremity clubbing/cyanosis [Declined Rectal Exam] : declined rectal exam [Normal] : affect was normal and insight and judgment were intact [de-identified] : per GYN

## 2021-05-18 NOTE — DATA REVIEWED
[FreeTextEntry1] : EKG shows NSR 77 bpm, normal axis and intervals with no acute ST T wave changes.

## 2021-05-18 NOTE — HISTORY OF PRESENT ILLNESS
[FreeTextEntry1] : CPE  [de-identified] : Overall she has been doing well although she remains fairly nonadherent with her lifestyle .  She does not exercising and she continues to smoke.  She has lost 8 lbs however as she is trying to eat healthier with more fruits and veggies  and now eating less meats.  She still likes to eat her Ravioli.  She does not  do home BGMShe still has not had Colonoscopy and never did FIT testing. \par She now has a mindy cat living with her now.  \par Asthma has been under good control and she has not required any steroids in the over the  last year.  She continues to smoke with no desire to quit. Compliant with Wixela and MOntelukast.    She continues to use ventolin frequently however after  triggers from cleaning products and cigarette smoke. \par UTD with GYN pap/mammo.\par She received the J+J covid vaccine.

## 2021-05-18 NOTE — PLAN
[FreeTextEntry1] : \par Compliance with screenings and consults again stressed.\par I have again advised yearly Opthalomology \par Refusing Colonsocopy.   FIT testing again provided\par Declnes cardiology for screening.\par Yearly flu shot. \par Await results of FBW. Vivek has previously declined statin. \par Smoking cessation stressed.  PT ed provided for Quit

## 2021-05-18 NOTE — PHYSICAL EXAM
[Normal TMs] : both tympanic membranes were normal [No Edema] : there was no peripheral edema [No Extremity Clubbing/Cyanosis] : no extremity clubbing/cyanosis [Declined Rectal Exam] : declined rectal exam [Normal] : affect was normal and insight and judgment were intact [de-identified] : per GYN

## 2021-05-18 NOTE — HISTORY OF PRESENT ILLNESS
[FreeTextEntry1] : CPE  [de-identified] : Overall she has been doing well although she remains fairly nonadherent with her lifestyle .  She does not exercising and she continues to smoke.  She has lost 8 lbs however as she is trying to eat healthier with more fruits and veggies  and now eating less meats.  She still likes to eat her Ravioli.  She does not  do home BGMShe still has not had Colonoscopy and never did FIT testing. \par She now has a mindy cat living with her now.  \par Asthma has been under good control and she has not required any steroids in the over the  last year.  She continues to smoke with no desire to quit. Compliant with Wixela and MOntelukast.    She continues to use ventolin frequently however after  triggers from cleaning products and cigarette smoke. \par UTD with GYN pap/mammo.\par She received the J+J covid vaccine.

## 2021-06-02 ENCOUNTER — RX RENEWAL (OUTPATIENT)
Age: 61
End: 2021-06-02

## 2021-07-21 ENCOUNTER — RX RENEWAL (OUTPATIENT)
Age: 61
End: 2021-07-21

## 2021-08-08 ENCOUNTER — RX RENEWAL (OUTPATIENT)
Age: 61
End: 2021-08-08

## 2021-09-16 ENCOUNTER — RX RENEWAL (OUTPATIENT)
Age: 61
End: 2021-09-16

## 2021-10-21 ENCOUNTER — TRANSCRIPTION ENCOUNTER (OUTPATIENT)
Age: 61
End: 2021-10-21

## 2021-12-13 DIAGNOSIS — Z20.822 CONTACT WITH AND (SUSPECTED) EXPOSURE TO COVID-19: ICD-10-CM

## 2021-12-21 ENCOUNTER — APPOINTMENT (OUTPATIENT)
Dept: INTERNAL MEDICINE | Facility: CLINIC | Age: 61
End: 2021-12-21

## 2022-01-09 ENCOUNTER — RX RENEWAL (OUTPATIENT)
Age: 62
End: 2022-01-09

## 2022-01-17 ENCOUNTER — APPOINTMENT (OUTPATIENT)
Dept: INTERNAL MEDICINE | Facility: CLINIC | Age: 62
End: 2022-01-17

## 2022-01-31 ENCOUNTER — RX RENEWAL (OUTPATIENT)
Age: 62
End: 2022-01-31

## 2022-02-11 ENCOUNTER — RX RENEWAL (OUTPATIENT)
Age: 62
End: 2022-02-11

## 2022-03-28 ENCOUNTER — RX RENEWAL (OUTPATIENT)
Age: 62
End: 2022-03-28

## 2022-04-18 ENCOUNTER — APPOINTMENT (OUTPATIENT)
Dept: INTERNAL MEDICINE | Facility: CLINIC | Age: 62
End: 2022-04-18
Payer: COMMERCIAL

## 2022-04-18 VITALS
SYSTOLIC BLOOD PRESSURE: 136 MMHG | OXYGEN SATURATION: 97 % | BODY MASS INDEX: 36.29 KG/M2 | HEART RATE: 74 BPM | TEMPERATURE: 98.7 F | WEIGHT: 180 LBS | RESPIRATION RATE: 16 BRPM | HEIGHT: 59 IN | DIASTOLIC BLOOD PRESSURE: 80 MMHG

## 2022-04-18 DIAGNOSIS — Z00.00 ENCOUNTER FOR GENERAL ADULT MEDICAL EXAMINATION W/OUT ABNORMAL FINDINGS: ICD-10-CM

## 2022-04-18 PROCEDURE — 99214 OFFICE O/P EST MOD 30 MIN: CPT

## 2022-04-18 RX ORDER — MELOXICAM 15 MG/1
15 TABLET ORAL DAILY
Qty: 30 | Refills: 3 | Status: DISCONTINUED | COMMUNITY
Start: 2017-05-23 | End: 2022-04-18

## 2022-04-18 NOTE — HISTORY OF PRESENT ILLNESS
[FreeTextEntry1] : JARON DM/HLD/Asthma  [de-identified] : She has been doing very well overall.  \par Overdue GYN visit. Did not see Optho\par Not checking BGMs and admits to eating sweets everyday but tries to eat fruits and veggies.  No regular exercise routine.  \par She continues to smoke 1/2-1ppd.  Not interested in quitting.  Declines screening with LDCT.  \par Treated few months ago at  for URI and placed on AB and possibly  prednisone with improvement.  \par  She remains sensitive to fumes  and uses her rescue inhaler  with good results. Compliant with Advair.  Has morning cough with sputum then ok.   \par Compliant with Metformin.

## 2022-04-18 NOTE — PLAN
[FreeTextEntry1] : \par Yearly eye exam stressed\par See Gyn Pap/mammo\par Stressed need for smoking cessation. Declines Ref to [email or web address].  NOt interested in quitting.\par Declines LDCT lung Ca screening\par Colonoscopy again advised.\par Rec starting Spiriva to improve Asthma control. Pt declines.\par  Declines  Pulmonary Evaluation\par Healthy diet and exercise. \par FU 3 months  for CPE with all FBW.  \par

## 2022-05-20 ENCOUNTER — RX RENEWAL (OUTPATIENT)
Age: 62
End: 2022-05-20

## 2022-05-31 ENCOUNTER — RX RENEWAL (OUTPATIENT)
Age: 62
End: 2022-05-31

## 2022-06-06 LAB
25(OH)D3 SERPL-MCNC: 22.1 NG/ML
ALBUMIN SERPL ELPH-MCNC: 4.8 G/DL
ALP BLD-CCNC: 93 U/L
ALT SERPL-CCNC: 16 U/L
ANION GAP SERPL CALC-SCNC: 13 MMOL/L
AST SERPL-CCNC: 14 U/L
BASOPHILS # BLD AUTO: 0.08 K/UL
BASOPHILS NFR BLD AUTO: 1.2 %
BILIRUB SERPL-MCNC: 0.2 MG/DL
BUN SERPL-MCNC: 19 MG/DL
CALCIUM SERPL-MCNC: 10.6 MG/DL
CHLORIDE SERPL-SCNC: 105 MMOL/L
CHOLEST SERPL-MCNC: 221 MG/DL
CO2 SERPL-SCNC: 23 MMOL/L
CREAT SERPL-MCNC: 0.84 MG/DL
EGFR: 79 ML/MIN/1.73M2
EOSINOPHIL # BLD AUTO: 0.23 K/UL
EOSINOPHIL NFR BLD AUTO: 3.4 %
ESTIMATED AVERAGE GLUCOSE: 151 MG/DL
FOLATE SERPL-MCNC: 9.3 NG/ML
GLUCOSE SERPL-MCNC: 144 MG/DL
HBA1C MFR BLD HPLC: 6.9 %
HCT VFR BLD CALC: 42.4 %
HDLC SERPL-MCNC: 47 MG/DL
HGB BLD-MCNC: 12.7 G/DL
IMM GRANULOCYTES NFR BLD AUTO: 0.7 %
LDLC SERPL CALC-MCNC: 149 MG/DL
LYMPHOCYTES # BLD AUTO: 3.1 K/UL
LYMPHOCYTES NFR BLD AUTO: 46.3 %
MAN DIFF?: NORMAL
MCHC RBC-ENTMCNC: 22.7 PG
MCHC RBC-ENTMCNC: 30 GM/DL
MCV RBC AUTO: 75.7 FL
MONOCYTES # BLD AUTO: 0.43 K/UL
MONOCYTES NFR BLD AUTO: 6.4 %
NEUTROPHILS # BLD AUTO: 2.81 K/UL
NEUTROPHILS NFR BLD AUTO: 42 %
NONHDLC SERPL-MCNC: 174 MG/DL
PLATELET # BLD AUTO: 455 K/UL
POTASSIUM SERPL-SCNC: 5.7 MMOL/L
PROT SERPL-MCNC: 6.9 G/DL
RBC # BLD: 5.6 M/UL
RBC # FLD: 15.5 %
SODIUM SERPL-SCNC: 142 MMOL/L
TRIGL SERPL-MCNC: 127 MG/DL
TSH SERPL-ACNC: 0.91 UIU/ML
VIT B12 SERPL-MCNC: 345 PG/ML
WBC # FLD AUTO: 6.7 K/UL

## 2022-06-17 ENCOUNTER — APPOINTMENT (OUTPATIENT)
Dept: INTERNAL MEDICINE | Facility: CLINIC | Age: 62
End: 2022-06-17
Payer: COMMERCIAL

## 2022-06-17 ENCOUNTER — NON-APPOINTMENT (OUTPATIENT)
Age: 62
End: 2022-06-17

## 2022-06-17 VITALS
RESPIRATION RATE: 16 BRPM | OXYGEN SATURATION: 97 % | TEMPERATURE: 99 F | DIASTOLIC BLOOD PRESSURE: 70 MMHG | SYSTOLIC BLOOD PRESSURE: 120 MMHG | HEART RATE: 96 BPM | BODY MASS INDEX: 36.29 KG/M2 | HEIGHT: 59 IN | WEIGHT: 180 LBS

## 2022-06-17 DIAGNOSIS — E87.5 HYPERKALEMIA: ICD-10-CM

## 2022-06-17 DIAGNOSIS — E83.52 HYPERCALCEMIA: ICD-10-CM

## 2022-06-17 DIAGNOSIS — E55.9 VITAMIN D DEFICIENCY, UNSPECIFIED: ICD-10-CM

## 2022-06-17 DIAGNOSIS — D45 POLYCYTHEMIA VERA: ICD-10-CM

## 2022-06-17 PROCEDURE — 99396 PREV VISIT EST AGE 40-64: CPT | Mod: 25

## 2022-06-17 PROCEDURE — 99214 OFFICE O/P EST MOD 30 MIN: CPT | Mod: 25

## 2022-06-17 RX ORDER — ALBUTEROL SULFATE 2.5 MG/3ML
(2.5 MG/3ML) SOLUTION RESPIRATORY (INHALATION)
Qty: 60 | Refills: 5 | Status: ACTIVE | COMMUNITY
Start: 1900-01-01 | End: 1900-01-01

## 2022-06-17 NOTE — PLAN
[FreeTextEntry1] : 1. Patient provided with RX for mammogram, lung screening, dexa scan. Refused colonoscopy screening. \par \par 2. Review of BW revealed elevated lipid, K, and calcium. Patient reports she consumes diet high in calcium and potassium. Patient advised to consume these foods in moderation. She is declining statin. She is willing to try red yeast rice. Repeat CMP and PTH done today in office will follow results.\par \par 3. Patient due for TDAP, will return at later time for vaccination\par \par 4. Patients medication renewed at patients request\par \par 5. Smoking cessation advised. Patient does not have plans to quit at this time\par \par All questions answered. Agrees with plan above

## 2022-06-17 NOTE — HEALTH RISK ASSESSMENT
[Current] : Current [Yes] : Yes [Monthly or less (1 pt)] : Monthly or less (1 point) [1 or 2 (0 pts)] : 1 or 2 (0 points) [Never (0 pts)] : Never (0 points) [No] : In the past 12 months have you used drugs other than those required for medical reasons? No [0] : 2) Feeling down, depressed, or hopeless: Not at all (0)

## 2022-06-17 NOTE — HISTORY OF PRESENT ILLNESS
[FreeTextEntry1] : CPE [de-identified] : Patient is a 62- year -old female who presents to our office today for yearly CPE.\par \par Overall has been doing well. Asthma has been controlled. Feels that her symptoms triggered by air freshener . Allergic to ferral cat- feels tightness when around cat. Alleviated with albuterol. Compliant with Advair and singular. No COVID infections. J & J x1 and Pfizer x2.\par \par Still smoking cigarettes. Smokes pack and a half x 40 years \par \par Not exercising. Does not follow a diet. \par \par Patient refusing colonoscopy and statin medication. Reports she is compliant with metformin.\par \par No acute complaints today.

## 2022-06-17 NOTE — PHYSICAL EXAM
[No Acute Distress] : no acute distress [Normal Oropharynx] : the oropharynx was normal [Normal TMs] : both tympanic membranes were normal [No JVD] : no jugular venous distention [No Lymphadenopathy] : no lymphadenopathy [Supple] : supple [No Respiratory Distress] : no respiratory distress  [No Accessory Muscle Use] : no accessory muscle use [Clear to Auscultation] : lungs were clear to auscultation bilaterally [Normal Rate] : normal rate  [Regular Rhythm] : with a regular rhythm [Normal S1, S2] : normal S1 and S2 [No Edema] : there was no peripheral edema [Soft] : abdomen soft [Non Tender] : non-tender [Non-distended] : non-distended [Normal Bowel Sounds] : normal bowel sounds [Normal Gait] : normal gait [Normal Affect] : the affect was normal [Alert and Oriented x3] : oriented to person, place, and time

## 2022-06-20 ENCOUNTER — NON-APPOINTMENT (OUTPATIENT)
Age: 62
End: 2022-06-20

## 2022-06-20 LAB
ALBUMIN SERPL ELPH-MCNC: 4.6 G/DL
ALP BLD-CCNC: 83 U/L
ALT SERPL-CCNC: 17 U/L
ANION GAP SERPL CALC-SCNC: 11 MMOL/L
AST SERPL-CCNC: 15 U/L
BILIRUB SERPL-MCNC: 0.2 MG/DL
BUN SERPL-MCNC: 12 MG/DL
CALCIUM SERPL-MCNC: 9.9 MG/DL
CALCIUM SERPL-MCNC: 9.9 MG/DL
CHLORIDE SERPL-SCNC: 105 MMOL/L
CO2 SERPL-SCNC: 23 MMOL/L
CREAT SERPL-MCNC: 0.74 MG/DL
EGFR: 92 ML/MIN/1.73M2
GLUCOSE SERPL-MCNC: 120 MG/DL
PARATHYROID HORMONE INTACT: 26 PG/ML
POTASSIUM SERPL-SCNC: 5.1 MMOL/L
PROT SERPL-MCNC: 6.7 G/DL
SODIUM SERPL-SCNC: 139 MMOL/L

## 2022-06-21 ENCOUNTER — APPOINTMENT (OUTPATIENT)
Dept: INTERNAL MEDICINE | Facility: CLINIC | Age: 62
End: 2022-06-21
Payer: COMMERCIAL

## 2022-06-21 PROCEDURE — 90715 TDAP VACCINE 7 YRS/> IM: CPT

## 2022-06-21 PROCEDURE — 90471 IMMUNIZATION ADMIN: CPT

## 2022-08-26 ENCOUNTER — NON-APPOINTMENT (OUTPATIENT)
Age: 62
End: 2022-08-26

## 2022-09-24 ENCOUNTER — NON-APPOINTMENT (OUTPATIENT)
Age: 62
End: 2022-09-24

## 2022-10-08 ENCOUNTER — NON-APPOINTMENT (OUTPATIENT)
Age: 62
End: 2022-10-08

## 2022-10-09 ENCOUNTER — RESULT REVIEW (OUTPATIENT)
Age: 62
End: 2022-10-09

## 2022-10-12 ENCOUNTER — APPOINTMENT (OUTPATIENT)
Dept: PULMONOLOGY | Facility: CLINIC | Age: 62
End: 2022-10-12

## 2022-10-12 ENCOUNTER — RX CHANGE (OUTPATIENT)
Age: 62
End: 2022-10-12

## 2022-10-12 VITALS
HEART RATE: 99 BPM | WEIGHT: 176 LBS | OXYGEN SATURATION: 95 % | BODY MASS INDEX: 35.48 KG/M2 | HEIGHT: 59 IN | SYSTOLIC BLOOD PRESSURE: 140 MMHG | TEMPERATURE: 98.2 F | RESPIRATION RATE: 16 BRPM | DIASTOLIC BLOOD PRESSURE: 80 MMHG

## 2022-10-12 VITALS — DIASTOLIC BLOOD PRESSURE: 80 MMHG | SYSTOLIC BLOOD PRESSURE: 130 MMHG | HEART RATE: 100 BPM | OXYGEN SATURATION: 94 %

## 2022-10-12 DIAGNOSIS — R07.89 OTHER CHEST PAIN: ICD-10-CM

## 2022-10-12 DIAGNOSIS — J18.9 PNEUMONIA, UNSPECIFIED ORGANISM: ICD-10-CM

## 2022-10-12 DIAGNOSIS — Z87.898 PERSONAL HISTORY OF OTHER SPECIFIED CONDITIONS: ICD-10-CM

## 2022-10-12 DIAGNOSIS — Z11.59 ENCOUNTER FOR SCREENING FOR OTHER VIRAL DISEASES: ICD-10-CM

## 2022-10-12 DIAGNOSIS — H70.091 ACUTE MASTOIDITIS WITH OTHER COMPLICATIONS, RIGHT EAR: ICD-10-CM

## 2022-10-12 DIAGNOSIS — J01.41 ACUTE RECURRENT PANSINUSITIS: ICD-10-CM

## 2022-10-12 PROCEDURE — 99213 OFFICE O/P EST LOW 20 MIN: CPT

## 2022-10-12 RX ORDER — FLUTICASONE PROPIONATE AND SALMETEROL 250; 50 UG/1; UG/1
250-50 POWDER RESPIRATORY (INHALATION)
Qty: 1 | Refills: 0 | Status: DISCONTINUED | COMMUNITY
Start: 2022-10-12 | End: 2022-10-12

## 2022-10-12 RX ORDER — FLUTICASONE PROPIONATE AND SALMETEROL XINAFOATE 230; 21 UG/1; UG/1
230-21 AEROSOL, METERED RESPIRATORY (INHALATION) TWICE DAILY
Qty: 12 | Refills: 3 | Status: DISCONTINUED | COMMUNITY
Start: 2022-10-12 | End: 2022-10-12

## 2022-10-12 RX ORDER — FLUTICASONE PROPIONATE AND SALMETEROL 250; 50 UG/1; UG/1
250-50 POWDER RESPIRATORY (INHALATION)
Qty: 1 | Refills: 5 | Status: DISCONTINUED | COMMUNITY
Start: 2020-07-09 | End: 2022-10-12

## 2022-10-12 RX ORDER — ALBUTEROL SULFATE 90 UG/1
108 (90 BASE) INHALANT RESPIRATORY (INHALATION)
Qty: 1 | Refills: 5 | Status: COMPLETED | COMMUNITY
Start: 2019-06-17 | End: 2022-10-12

## 2022-10-12 NOTE — COUNSELING
[Fall prevention counseling provided] : Fall prevention counseling provided [Behavioral health counseling provided] : Behavioral health counseling provided [Sleep ___ hours/day] : Sleep [unfilled] hours/day [Engage in a relaxing activity] : Engage in a relaxing activity [Plan in advance] : Plan in advance [None] : None [Good understanding] : Patient has a good understanding of lifestyle changes and steps needed to achieve self management goal [de-identified] : URI;\par take medications as prescribed/directed\par use of OTC probiotic recommended.\par Recommend supportive care including antipyretics, fluids, OTC cough/cold medications if age-appropriate, and nasal saline rinse/ wash. \par infection prevention education as discussed above; view counseling section.\par notify/return to office if worsening/persistent symptoms or new onset complaints/concerns, in the event of any emergency or life threatening condition, go to the nearest emergency department and then notify office. \par patient verbalized understanding and agrees with plan of care.

## 2022-10-12 NOTE — REVIEW OF SYSTEMS
[Fatigue] : fatigue [Nasal Discharge] : nasal discharge [Sore Throat] : sore throat [Postnasal Drip] : postnasal drip [Cough] : cough [Dyspnea on Exertion] : dyspnea on exertion [Headache] : headache [Negative] : Heme/Lymph [Fever] : no fever [Chills] : no chills [Hot Flashes] : no hot flashes [Night Sweats] : no night sweats [Recent Change In Weight] : ~T no recent weight change [Earache] : no earache [Hearing Loss] : no hearing loss [Nosebleed] : no nosebleeds [Hoarseness] : no hoarseness [Shortness Of Breath] : no shortness of breath [Wheezing] : no wheezing [Abdominal Pain] : no abdominal pain [Nausea] : no nausea [Constipation] : no constipation [Diarrhea] : diarrhea [Vomiting] : no vomiting [Heartburn] : no heartburn [Melena] : no melena [Skin Rash] : no skin rash [Dizziness] : no dizziness

## 2022-10-12 NOTE — PHYSICAL EXAM
[No Acute Distress] : no acute distress [Normal Sclera/Conjunctiva] : normal sclera/conjunctiva [PERRL] : pupils equal round and reactive to light [Normal TMs] : both tympanic membranes were normal [No Respiratory Distress] : no respiratory distress  [No Accessory Muscle Use] : no accessory muscle use [Normal Rate] : normal rate  [Regular Rhythm] : with a regular rhythm [Normal S1, S2] : normal S1 and S2 [No Edema] : there was no peripheral edema [Soft] : abdomen soft [Non Tender] : non-tender [Non-distended] : non-distended [Normal Bowel Sounds] : normal bowel sounds [Normal Posterior Cervical Nodes] : no posterior cervical lymphadenopathy [Cervical Lymph Nodes Enlarged Anterior Left] : enenlarged node(s) on the left [Size ___ cm] : [unfilled]Ucm(s) in diameter [Tender] : tender [Mobile] : mobile [No Rash] : no rash [Coordination Grossly Intact] : coordination grossly intact [Normal Gait] : normal gait [Normal Affect] : the affect was normal [Normal Insight/Judgement] : insight and judgment were intact [de-identified] : irritated nasal mucosa, large post nasal drip, nasal & sinus congestion [de-identified] : diffuse rhonchi, improved following chest PT and deeping breathing/ coughing, patient cleared yellow thick mucus

## 2022-10-12 NOTE — HISTORY OF PRESENT ILLNESS
[FreeTextEntry8] : this is a 63yo patient p/w productive cough, sore throat, x 2 weeks symptoms started 09/24/22. patient had been around her sister who tested +covid19 so she went to  for evaluation. \par \par patient was evaluated in  09/25/22, tested covid19 neg. patient was Tx with azithromycin antibiotic for cough/ swollen glands/ sore throat.\par \par patient completed Z pack, reports some improvement in her symptoms, then symptoms returned around 10/04/22, managed conservatively with no improvement and returned to .\par \par seen in  again 10/09/22, tested negative covid19, had CXR done which was normal. she was prescribed Doxy although she did not take antibiotic because she reports she cannot tolerate this medication- gets yeast infection from use. she was also prescribed Benzonatate 100mg which she has been using and reports some improvement. \par \par patient continues with sinus & nasal congestion, reports cough productive yellow sputum and reactive at times with coughing fits, HA and sore throat. \par \par immunized J & J x1 and Pfizer x2, no fluvx yet. \par \par reports malaise, nausea, denies vomit. \par \par OTC mgmt includes Advil as directed. \par \par significant pulm. hx includes Asthma usually well controlled, Compliant with Advair and singular, however she has not been able to tolerate Advair- is causing gagging and coughing however she is still trying to use it. using prn Ventolin as prescribed but does not feel it helps at times. \par \par Still smoking cigarettes. Smokes pack and a half x 40 years, reports tried to cut down since she is feeling sick but she continues to smoke daily. not receptive to discussing cessation at this time. \par \par patient reports SOB during coughing fits, denies fever, CP, diarrhea, pleurisy, or hemoptysis. \par \par he is in no acute distress, will evaluate and manage as appropriate.

## 2022-10-12 NOTE — ASSESSMENT
[FreeTextEntry1] : Valeria Herrera is a 61yo patient of Brenda Bejarano, p/w reactive cough, productive yellow sputum likely 2/2 acute asthmatic bronchitis, will manage with oral steroid taper and PO fluoroquinolone antibiotic therapy, patient to continue home chest PT. in regards to intolerance with Advair discus- will switch to HFA inhaler.  \par \par - start Levofloxacin 750mg daily x5 days, counseled on medication use, s/e, a/r. \par \par - start Prednisone steroid taper (20mg tab), 40mg daily for 6 days counseled on medication use, s/e, a/r.\par \par notify our office if any s/s side effects or adverse reaction. for serious/potentially life threatening reactions- go to nearest emergency department and then notify our office.\par \par - advised to continue chest PT, OTC Mucinex as directed cough/chest congestion. \par \par - switch Advair discus to HFA inhaler. \par \par - patient will o/w continue medications as prescribed, and continue f/u with specialists & PCP NP Brenda Bejarano.\par \par  notify office if worsening/persistent symptoms or new onset complaints/concerns, in the event of any emergency or life threatening condition, go to the nearest emergency department and then notify office. \par patient verbalized understanding and agrees with plan of care.

## 2023-02-21 ENCOUNTER — RX RENEWAL (OUTPATIENT)
Age: 63
End: 2023-02-21

## 2023-03-06 LAB
25(OH)D3 SERPL-MCNC: 21.8 NG/ML
ALBUMIN SERPL ELPH-MCNC: 4.6 G/DL
ALP BLD-CCNC: 91 U/L
ALT SERPL-CCNC: 14 U/L
ANION GAP SERPL CALC-SCNC: 15 MMOL/L
AST SERPL-CCNC: 14 U/L
BASOPHILS # BLD AUTO: 0.13 K/UL
BASOPHILS NFR BLD AUTO: 1.6 %
BILIRUB SERPL-MCNC: 0.2 MG/DL
BUN SERPL-MCNC: 20 MG/DL
CALCIUM SERPL-MCNC: 10.2 MG/DL
CHLORIDE SERPL-SCNC: 104 MMOL/L
CHOLEST SERPL-MCNC: 229 MG/DL
CO2 SERPL-SCNC: 23 MMOL/L
CREAT SERPL-MCNC: 0.88 MG/DL
EGFR: 75 ML/MIN/1.73M2
EOSINOPHIL # BLD AUTO: 0.22 K/UL
EOSINOPHIL NFR BLD AUTO: 2.7 %
ESTIMATED AVERAGE GLUCOSE: 157 MG/DL
FOLATE SERPL-MCNC: 8 NG/ML
GLUCOSE SERPL-MCNC: 139 MG/DL
HBA1C MFR BLD HPLC: 7.1 %
HCT VFR BLD CALC: 40.6 %
HDLC SERPL-MCNC: 50 MG/DL
HGB BLD-MCNC: 12.7 G/DL
IMM GRANULOCYTES NFR BLD AUTO: 0.5 %
LDLC SERPL CALC-MCNC: 159 MG/DL
LYMPHOCYTES # BLD AUTO: 3.88 K/UL
LYMPHOCYTES NFR BLD AUTO: 48.3 %
MAN DIFF?: NORMAL
MCHC RBC-ENTMCNC: 22.9 PG
MCHC RBC-ENTMCNC: 31.3 GM/DL
MCV RBC AUTO: 73.2 FL
MONOCYTES # BLD AUTO: 0.5 K/UL
MONOCYTES NFR BLD AUTO: 6.2 %
NEUTROPHILS # BLD AUTO: 3.26 K/UL
NEUTROPHILS NFR BLD AUTO: 40.7 %
NONHDLC SERPL-MCNC: 180 MG/DL
PLATELET # BLD AUTO: 501 K/UL
POTASSIUM SERPL-SCNC: 5.5 MMOL/L
PROT SERPL-MCNC: 7 G/DL
RBC # BLD: 5.55 M/UL
RBC # FLD: 15.3 %
SODIUM SERPL-SCNC: 142 MMOL/L
TRIGL SERPL-MCNC: 103 MG/DL
TSH SERPL-ACNC: 0.8 UIU/ML
VIT B12 SERPL-MCNC: 337 PG/ML
WBC # FLD AUTO: 8.03 K/UL

## 2023-03-16 ENCOUNTER — APPOINTMENT (OUTPATIENT)
Dept: INTERNAL MEDICINE | Facility: CLINIC | Age: 63
End: 2023-03-16
Payer: COMMERCIAL

## 2023-03-16 VITALS
HEART RATE: 87 BPM | HEIGHT: 59.06 IN | DIASTOLIC BLOOD PRESSURE: 76 MMHG | TEMPERATURE: 98 F | SYSTOLIC BLOOD PRESSURE: 142 MMHG | RESPIRATION RATE: 16 BRPM | WEIGHT: 177 LBS | BODY MASS INDEX: 35.68 KG/M2 | OXYGEN SATURATION: 97 %

## 2023-03-16 DIAGNOSIS — E66.9 OBESITY, UNSPECIFIED: ICD-10-CM

## 2023-03-16 DIAGNOSIS — Z86.39 PERSONAL HISTORY OF OTHER ENDOCRINE, NUTRITIONAL AND METABOLIC DISEASE: ICD-10-CM

## 2023-03-16 DIAGNOSIS — M54.2 CERVICALGIA: ICD-10-CM

## 2023-03-16 PROCEDURE — 99214 OFFICE O/P EST MOD 30 MIN: CPT

## 2023-03-16 RX ORDER — LEVOFLOXACIN 750 MG/1
750 TABLET, FILM COATED ORAL DAILY
Qty: 5 | Refills: 0 | Status: DISCONTINUED | COMMUNITY
Start: 2022-10-12 | End: 2023-03-16

## 2023-03-16 RX ORDER — ERGOCALCIFEROL 1.25 MG/1
1.25 MG CAPSULE, LIQUID FILLED ORAL
Qty: 12 | Refills: 0 | Status: ACTIVE | COMMUNITY
Start: 2023-03-16 | End: 1900-01-01

## 2023-03-16 RX ORDER — PREDNISONE 20 MG/1
20 TABLET ORAL DAILY
Qty: 12 | Refills: 0 | Status: DISCONTINUED | COMMUNITY
Start: 2022-10-12 | End: 2023-03-16

## 2023-03-16 NOTE — PLAN
[FreeTextEntry1] : 1. The patient was educated on low fat low carb ADA diet. She does not wish to add additional dm2 therapy and would like to improve her diet. We will repeat a1c in 6 months\par \par 2. The patient has agreed to start statin, crestor 5mg q HS. We will repeat lipid in 6 months (declined BW sooner)\par \par 3. The patient will trial meloxicam for her neck pain \par \par 4. D2 weekly x12 weeks, then d3 5000iu daily\par \par 5. F/U 6 months

## 2023-03-16 NOTE — HISTORY OF PRESENT ILLNESS
[FreeTextEntry1] : F/U [de-identified] : The patient is 62-year-old female with PMH of asthma, beta thalassemia, GERD, tobacco abuse, HLD, DM2, CAMILLE, obesity, vit d deficiency who presents to office today for follow up. The patient is requesting review of recent lab work.\par \par The patient has hx of DM2, currently on metformin 1000mg BID. The patient admits dietary indiscretion over the past few months. A1C 7.1. Does not wish to add additional meds at this time, would like to work on diet.\par \par The patient lipid panel is elevated. CV risk 19%. The patient has refused statin medication in the past. In addition, She continues to smoke 1 1/2 packs of ciggarettes per day.\par \par Patient brought in forms for work that need to be filled out.\par \par Has acute L sided neck pain s/p lifting child at work. Pain improves with NSAIDS.

## 2023-04-12 ENCOUNTER — APPOINTMENT (OUTPATIENT)
Dept: INTERNAL MEDICINE | Facility: CLINIC | Age: 63
End: 2023-04-12
Payer: COMMERCIAL

## 2023-04-12 VITALS
HEIGHT: 59.6 IN | HEART RATE: 86 BPM | DIASTOLIC BLOOD PRESSURE: 80 MMHG | BODY MASS INDEX: 32.43 KG/M2 | SYSTOLIC BLOOD PRESSURE: 140 MMHG | TEMPERATURE: 98 F | OXYGEN SATURATION: 97 % | RESPIRATION RATE: 16 BRPM | WEIGHT: 163 LBS

## 2023-04-12 DIAGNOSIS — J45.909 UNSPECIFIED ASTHMA, UNCOMPLICATED: ICD-10-CM

## 2023-04-12 DIAGNOSIS — J02.9 ACUTE PHARYNGITIS, UNSPECIFIED: ICD-10-CM

## 2023-04-12 DIAGNOSIS — R05.9 COUGH, UNSPECIFIED: ICD-10-CM

## 2023-04-12 DIAGNOSIS — J32.9 CHRONIC SINUSITIS, UNSPECIFIED: ICD-10-CM

## 2023-04-12 PROCEDURE — 99213 OFFICE O/P EST LOW 20 MIN: CPT

## 2023-04-12 RX ORDER — LEVOFLOXACIN 750 MG/1
750 TABLET, FILM COATED ORAL DAILY
Qty: 7 | Refills: 0 | Status: ACTIVE | COMMUNITY
Start: 2023-04-12 | End: 1900-01-01

## 2023-04-12 RX ORDER — PREDNISONE 20 MG/1
20 TABLET ORAL TWICE DAILY
Qty: 12 | Refills: 0 | Status: ACTIVE | COMMUNITY
Start: 2023-04-12 | End: 1900-01-01

## 2023-04-12 RX ORDER — FLUTICASONE PROPIONATE 50 UG/1
50 SPRAY, METERED NASAL
Qty: 1 | Refills: 2 | Status: ACTIVE | COMMUNITY
Start: 2023-04-12 | End: 1900-01-01

## 2023-04-12 NOTE — PHYSICAL EXAM
[No Acute Distress] : no acute distress [Normal Sclera/Conjunctiva] : normal sclera/conjunctiva [Normal Outer Ear/Nose] : the outer ears and nose were normal in appearance [No JVD] : no jugular venous distention [No Respiratory Distress] : no respiratory distress  [No Accessory Muscle Use] : no accessory muscle use [Clear to Auscultation] : lungs were clear to auscultation bilaterally [Normal Rate] : normal rate  [Regular Rhythm] : with a regular rhythm [Normal S1, S2] : normal S1 and S2 [No Edema] : there was no peripheral edema [No Extremity Clubbing/Cyanosis] : no extremity clubbing/cyanosis [Soft] : abdomen soft [Non Tender] : non-tender [Non-distended] : non-distended [No CVA Tenderness] : no CVA  tenderness [No Joint Swelling] : no joint swelling [Grossly Normal Strength/Tone] : grossly normal strength/tone [No Rash] : no rash [No Focal Deficits] : no focal deficits [Normal Gait] : normal gait [Normal Affect] : the affect was normal [Normal Insight/Judgement] : insight and judgment were intact [de-identified] : injected posterior pharynx  [de-identified] : enlarged left submandibular lymphadenopathy  [de-identified] : + forced exp wheeze [de-identified] : enlarged left submandibular lymphadenopathy

## 2023-04-12 NOTE — HISTORY OF PRESENT ILLNESS
[FreeTextEntry8] : 63F w/ PMHx of Asthma, Active Nicotine Smoker 1.5 packs a day x 40 years, DM2, GERD, and HLD presents to office today c/o progressively worsening cough, fever/chills, fatigue and malaise x 1 week with associated pharyngitis, and swollen glands. She has taken Tylenol and Advil without much improvement in symptoms. She remains on Advair, Albuterol q 6h ATC and Singulair. At home Covid test negative. 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 Denies SOB, GARIBAY, CP or palps.

## 2023-04-12 NOTE — PLAN
[FreeTextEntry1] : 1. Rapid Strep negative.\par \par 2. Respiratory viral panel & Sputum culture.\par \par 3. Start Flonase 2 spritz each nostril daily.\par \par 4. Start Prednisone 20mg PO BID x 6 days.\par \par 5. Start Levaquin 750mg PO daily x 7 days (she refused Zithromax and doxycycline).\par \par 6. F/U with Dr. Sellers for full PFT and Lung cancer screening low dose CT Chest. \par \par 7. F/U in 4 weeks if lymphadenopathy does not improve. Pt verbalizes understanding.

## 2023-04-12 NOTE — REVIEW OF SYSTEMS
[Fever] : fever [Chills] : chills [Fatigue] : fatigue [Night Sweats] : no night sweats [Recent Change In Weight] : ~T no recent weight change [Discharge] : no discharge [Pain] : no pain [Redness] : no redness [Vision Problems] : no vision problems [Itching] : no itching [Earache] : earache [Hearing Loss] : no hearing loss [Nosebleed] : no nosebleeds [Hoarseness] : no hoarseness [Nasal Discharge] : nasal discharge [Sore Throat] : sore throat [Postnasal Drip] : postnasal drip [Chest Pain] : no chest pain [Palpitations] : no palpitations [Lower Ext Edema] : no lower extremity edema [Orthopnea] : no orthopnea [Paroxysmal Nocturnal Dyspnea] : no paroxysmal nocturnal dyspnea [Shortness Of Breath] : no shortness of breath [Wheezing] : no wheezing [Cough] : cough [Dyspnea on Exertion] : no dyspnea on exertion [Abdominal Pain] : no abdominal pain [Nausea] : no nausea [Constipation] : no constipation [Diarrhea] : diarrhea [Vomiting] : no vomiting [Heartburn] : no heartburn [Dysuria] : no dysuria [Hematuria] : no hematuria [Joint Pain] : no joint pain [Muscle Pain] : no muscle pain [Itching] : no itching [Skin Rash] : no skin rash [Headache] : no headache [Dizziness] : no dizziness [Fainting] : no fainting [Insomnia] : no insomnia [Anxiety] : no anxiety [Depression] : no depression [Easy Bruising] : no easy bruising [Swollen Glands] : swollen glands

## 2023-04-13 ENCOUNTER — NON-APPOINTMENT (OUTPATIENT)
Age: 63
End: 2023-04-13

## 2023-04-13 LAB
HADV DNA SPEC QL NAA+PROBE: DETECTED
RAPID RVP RESULT: DETECTED
SARS-COV-2 RNA PNL RESP NAA+PROBE: NOT DETECTED

## 2023-04-17 ENCOUNTER — NON-APPOINTMENT (OUTPATIENT)
Age: 63
End: 2023-04-17

## 2023-04-17 LAB — BACTERIA SPT CULT: NORMAL

## 2023-05-15 ENCOUNTER — RX RENEWAL (OUTPATIENT)
Age: 63
End: 2023-05-15

## 2023-07-10 ENCOUNTER — RX RENEWAL (OUTPATIENT)
Age: 63
End: 2023-07-10

## 2023-08-08 ENCOUNTER — NON-APPOINTMENT (OUTPATIENT)
Age: 63
End: 2023-08-08

## 2023-08-18 ENCOUNTER — NON-APPOINTMENT (OUTPATIENT)
Age: 63
End: 2023-08-18

## 2023-08-18 LAB
ESTIMATED AVERAGE GLUCOSE: 169 MG/DL
HBA1C MFR BLD HPLC: 7.5 %

## 2023-08-25 ENCOUNTER — APPOINTMENT (OUTPATIENT)
Dept: INTERNAL MEDICINE | Facility: CLINIC | Age: 63
End: 2023-08-25
Payer: COMMERCIAL

## 2023-08-25 VITALS
WEIGHT: 177 LBS | HEART RATE: 86 BPM | TEMPERATURE: 98.1 F | DIASTOLIC BLOOD PRESSURE: 80 MMHG | OXYGEN SATURATION: 96 % | BODY MASS INDEX: 35.21 KG/M2 | SYSTOLIC BLOOD PRESSURE: 130 MMHG | RESPIRATION RATE: 16 BRPM | HEIGHT: 59.6 IN

## 2023-08-25 DIAGNOSIS — J45.40 MODERATE PERSISTENT ASTHMA, UNCOMPLICATED: ICD-10-CM

## 2023-08-25 DIAGNOSIS — F17.200 NICOTINE DEPENDENCE, UNSPECIFIED, UNCOMPLICATED: ICD-10-CM

## 2023-08-25 DIAGNOSIS — Z87.891 PERSONAL HISTORY OF NICOTINE DEPENDENCE: ICD-10-CM

## 2023-08-25 PROCEDURE — 99213 OFFICE O/P EST LOW 20 MIN: CPT

## 2023-08-25 RX ORDER — BLOOD-GLUCOSE METER
W/DEVICE EACH MISCELLANEOUS
Qty: 1 | Refills: 0 | Status: DISCONTINUED | COMMUNITY
Start: 2018-08-16 | End: 2023-08-25

## 2023-08-25 RX ORDER — MELOXICAM 15 MG/1
15 TABLET ORAL
Qty: 30 | Refills: 1 | Status: DISCONTINUED | COMMUNITY
Start: 2023-03-16 | End: 2023-08-25

## 2023-08-25 RX ORDER — FLUTICASONE PROPIONATE AND SALMETEROL XINAFOATE 230; 21 UG/1; UG/1
230-21 AEROSOL, METERED RESPIRATORY (INHALATION)
Qty: 12 | Refills: 3 | Status: DISCONTINUED | COMMUNITY
Start: 2022-10-12 | End: 2023-08-25

## 2023-08-25 NOTE — PLAN
[FreeTextEntry1] : 1. Pt has refused routine blood work at this time.  2. Start ... 3. Continue Metformin. 4. Referral to endocrinology. 5. Educated on importance of low fat/cholesterol/concentrated carbohydrate diet and smoking cessation.  6. [facility] DMV formed filled out.

## 2023-08-25 NOTE — PHYSICAL EXAM
[No Acute Distress] : no acute distress [Normal Sclera/Conjunctiva] : normal sclera/conjunctiva [Normal Outer Ear/Nose] : the outer ears and nose were normal in appearance [Normal Oropharynx] : the oropharynx was normal [Normal TMs] : both tympanic membranes were normal [No JVD] : no jugular venous distention [No Lymphadenopathy] : no lymphadenopathy [Supple] : supple [No Respiratory Distress] : no respiratory distress  [No Accessory Muscle Use] : no accessory muscle use [Clear to Auscultation] : lungs were clear to auscultation bilaterally [Normal Rate] : normal rate  [Regular Rhythm] : with a regular rhythm [Normal S1, S2] : normal S1 and S2 [No Edema] : there was no peripheral edema [No Extremity Clubbing/Cyanosis] : no extremity clubbing/cyanosis [Soft] : abdomen soft [Non Tender] : non-tender [Non-distended] : non-distended [Normal Anterior Cervical Nodes] : no anterior cervical lymphadenopathy [No CVA Tenderness] : no CVA  tenderness [No Joint Swelling] : no joint swelling [Grossly Normal Strength/Tone] : grossly normal strength/tone [No Rash] : no rash [No Focal Deficits] : no focal deficits [Normal Gait] : normal gait [Normal Affect] : the affect was normal [Normal Insight/Judgement] : insight and judgment were intact

## 2023-08-25 NOTE — REVIEW OF SYSTEMS
[Fever] : no fever [Chills] : no chills [Fatigue] : no fatigue [Night Sweats] : no night sweats [Recent Change In Weight] : ~T no recent weight change [Discharge] : no discharge [Pain] : no pain [Redness] : no redness [Vision Problems] : no vision problems [Itching] : no itching [Earache] : no earache [Hearing Loss] : no hearing loss [Nosebleed] : no nosebleeds [Hoarseness] : no hoarseness [Nasal Discharge] : no nasal discharge [Sore Throat] : no sore throat [Postnasal Drip] : no postnasal drip [Chest Pain] : no chest pain [Palpitations] : no palpitations [Lower Ext Edema] : no lower extremity edema [Orthopnea] : no orthopnea [Paroxysmal Nocturnal Dyspnea] : no paroxysmal nocturnal dyspnea [Shortness Of Breath] : no shortness of breath [Wheezing] : no wheezing [Cough] : cough [Dyspnea on Exertion] : no dyspnea on exertion [Abdominal Pain] : no abdominal pain [Nausea] : no nausea [Constipation] : no constipation [Diarrhea] : diarrhea [Vomiting] : no vomiting [Heartburn] : no heartburn [Melena] : no melena [Dysuria] : no dysuria [Hematuria] : no hematuria [Joint Pain] : no joint pain [Muscle Pain] : no muscle pain [Itching] : no itching [Skin Rash] : no skin rash [Headache] : no headache [Dizziness] : no dizziness [Fainting] : no fainting [Insomnia] : no insomnia [Anxiety] : no anxiety [Depression] : no depression [Easy Bruising] : no easy bruising [Swollen Glands] : no swollen glands

## 2023-08-25 NOTE — HISTORY OF PRESENT ILLNESS
[FreeTextEntry1] : F/U [de-identified] : 63F w/ PMHx of Asthma, Active Nicotine Smoker 1.5 packs a day x 40 years, uncontrolled DM2, GERD, and HLD presents to office today have DMV form filled out for work as she is a .   She reports feeling well and offers no complaints.   She stopped taking the Crestor. She continues to smoke.   A1c 7.5 on Metformin 2000mg/day. No reports of polydipsia or polyuria.

## 2023-11-06 ENCOUNTER — RX RENEWAL (OUTPATIENT)
Age: 63
End: 2023-11-06

## 2023-11-06 RX ORDER — ROSUVASTATIN CALCIUM 5 MG/1
5 TABLET, FILM COATED ORAL
Qty: 90 | Refills: 3 | Status: ACTIVE | COMMUNITY
Start: 2023-03-16 | End: 1900-01-01

## 2023-11-14 ENCOUNTER — RX RENEWAL (OUTPATIENT)
Age: 63
End: 2023-11-14

## 2023-11-14 RX ORDER — MONTELUKAST 10 MG/1
10 TABLET, FILM COATED ORAL
Qty: 90 | Refills: 3 | Status: ACTIVE | COMMUNITY
Start: 2016-10-31 | End: 1900-01-01

## 2023-12-14 ENCOUNTER — APPOINTMENT (OUTPATIENT)
Dept: ENDOCRINOLOGY | Facility: CLINIC | Age: 63
End: 2023-12-14
Payer: COMMERCIAL

## 2023-12-14 VITALS
HEART RATE: 108 BPM | BODY MASS INDEX: 34.68 KG/M2 | DIASTOLIC BLOOD PRESSURE: 54 MMHG | HEIGHT: 59 IN | OXYGEN SATURATION: 98 % | WEIGHT: 172 LBS | RESPIRATION RATE: 18 BRPM | SYSTOLIC BLOOD PRESSURE: 166 MMHG

## 2023-12-14 DIAGNOSIS — E11.9 TYPE 2 DIABETES MELLITUS W/OUT COMPLICATIONS: ICD-10-CM

## 2023-12-14 PROCEDURE — 99214 OFFICE O/P EST MOD 30 MIN: CPT | Mod: 25

## 2023-12-14 PROCEDURE — 82962 GLUCOSE BLOOD TEST: CPT

## 2023-12-14 PROCEDURE — 83036 HEMOGLOBIN GLYCOSYLATED A1C: CPT | Mod: QW

## 2023-12-14 RX ORDER — SITAGLIPTIN AND METFORMIN HYDROCHLORIDE 50; 1000 MG/1; MG/1
50-1000 TABLET, FILM COATED ORAL TWICE DAILY
Qty: 60 | Refills: 3 | Status: DISCONTINUED | COMMUNITY
Start: 2023-08-28 | End: 2023-12-14

## 2023-12-17 NOTE — ASSESSMENT
[FreeTextEntry1] : 63 year old female with past medical history of Diabetes Mellitus Type 2, Current Smoker who presents for management of her diabetes  1. DM 2- uncontrolled, uncomplicated Patient counseled on the importance of diabetic control and risk of complications. We discussed about microvascular disease and macrovascular disease. We discussed the importance of ophthalmology evaluations annually or more frequent as necessary. We also discussed the importance of diabetes foot care. Some form of glucose monitoring is always advised. Maintaining a low carbohydrate/ADA diet and physical activity was discussed. Patient's diet and the necessary changes discussed. Reviewed over freestyle chloe and use. Reviewed over glycemic goals. Discussed other options for diabetes control.   Cont Metformin 1000 mg BID Start Glipizide 2.5 mg QD Cont ADA diet Cont exercise Will refer to CDE/RD Labs UTD Opthalmology Visit needs to establish Foot Exam at follow up   2. HLD- stable Cont Rosuvastatin Not compliant Will try to take it

## 2023-12-17 NOTE — HISTORY OF PRESENT ILLNESS
[FreeTextEntry1] : Ms. SHELTON GOINS  is a 63 year old female with past medical history of Diabetes Mellitus Type 2, Current Smoker who presents for management of her diabetes. Patient denies any history of diabetic retinopathy, nephropathy or neuropathy. She denies any blurry vision, polyuria, polydipsia and numbness/tingling in the extremities.   POCT Glucose: 216mg/dL POCT Hga1c: 7.5%   Diabetes first diagnosed:less then 10 years Type:2  Current diabetic regimen: Metformin 1000 mg twice a day Janumet (stomach pain, abdominal pain) Other relevant medications:  Self monitoring blood glucose : Glucometer: 4x times per day CGM: Does not monitor  Hypoglycemia:  Diet: Breakfast: coffee with sugar and half and half Lunch: pasta, chicken, fish, red meat Dinner: pasta, chicken, fish, red meat Snacks: ice cream, cake  Exercise:  Urine micro: lipid profile: (3/2023), last hba1c:7.5% (12/2023) eye exam:none diabetic foot exam/podiatry:na

## 2024-01-08 ENCOUNTER — RX RENEWAL (OUTPATIENT)
Age: 64
End: 2024-01-08

## 2024-01-08 RX ORDER — RABEPRAZOLE SODIUM 20 MG/1
20 TABLET, DELAYED RELEASE ORAL
Qty: 30 | Refills: 5 | Status: ACTIVE | COMMUNITY
Start: 2018-02-21 | End: 1900-01-01

## 2024-01-11 RX ORDER — FLUTICASONE PROPIONATE AND SALMETEROL 250; 50 UG/1; UG/1
250-50 POWDER RESPIRATORY (INHALATION)
Qty: 3 | Refills: 1 | Status: ACTIVE | COMMUNITY
Start: 2023-06-22 | End: 1900-01-01

## 2024-03-18 RX ORDER — METFORMIN HYDROCHLORIDE 1000 MG/1
1000 TABLET, COATED ORAL
Qty: 180 | Refills: 5 | Status: ACTIVE | COMMUNITY
Start: 2016-10-12 | End: 1900-01-01

## 2024-04-02 ENCOUNTER — APPOINTMENT (OUTPATIENT)
Dept: ENDOCRINOLOGY | Facility: CLINIC | Age: 64
End: 2024-04-02
Payer: COMMERCIAL

## 2024-04-02 ENCOUNTER — RX RENEWAL (OUTPATIENT)
Age: 64
End: 2024-04-02

## 2024-04-02 VITALS
BODY MASS INDEX: 34.94 KG/M2 | DIASTOLIC BLOOD PRESSURE: 72 MMHG | HEIGHT: 58.5 IN | WEIGHT: 171 LBS | HEART RATE: 97 BPM | OXYGEN SATURATION: 98 % | SYSTOLIC BLOOD PRESSURE: 138 MMHG

## 2024-04-02 DIAGNOSIS — E78.00 PURE HYPERCHOLESTEROLEMIA, UNSPECIFIED: ICD-10-CM

## 2024-04-02 DIAGNOSIS — E11.9 TYPE 2 DIABETES MELLITUS W/OUT COMPLICATIONS: ICD-10-CM

## 2024-04-02 PROCEDURE — 99214 OFFICE O/P EST MOD 30 MIN: CPT | Mod: 25

## 2024-04-02 PROCEDURE — 82962 GLUCOSE BLOOD TEST: CPT

## 2024-04-02 PROCEDURE — 83036 HEMOGLOBIN GLYCOSYLATED A1C: CPT | Mod: QW

## 2024-04-02 NOTE — HISTORY OF PRESENT ILLNESS
[FreeTextEntry1] : Ms. SHELTON GOINS  is a 63 year old female with past medical history of Diabetes Mellitus Type 2, Current Smoker who presents for management of her diabetes. Patient denies any history of diabetic retinopathy, nephropathy or neuropathy. She denies any blurry vision, polyuria, polydipsia and numbness/tingling in the extremities.   POCT Glucose: 149 mg/dL POCT Hga1c: 6.3%   Diabetes first diagnosed:less then 10 years Type:2  Current diabetic regimen: Metformin 1000 mg twice a day Glipizide 2.5 mg QD Janumet (stomach pain, abdominal pain) Other relevant medications:  Self monitoring blood glucose : Glucometer: 4x times per day CGM: Does not monitor  Hypoglycemia:  Diet: Breakfast: coffee with sugar and half and half Lunch: pasta, chicken, fish, red meat Dinner: pasta, chicken, fish, red meat Snacks: ice cream, cake  Exercise:  Urine micro: lipid profile: (3/2023), last hba1c:6.3% (4/2024), 7.5% (12/2023) eye exam:none diabetic foot exam/podiatry:na

## 2024-04-02 NOTE — PHYSICAL EXAM
[Alert] : alert [Well Developed] : well developed [No Acute Distress] : no acute distress [Normal Voice/Communication] : normal voice communication [No Rash] : no rash [Right foot was examined, including] : right foot ~C was examined, including visual inspection with sensory and pulse exams [Left foot was examined, including] : left foot ~C was examined, including visual inspection with sensory and pulse exams [Normal] : normal [Full ROM] : with full range of motion [2+] : 2+ in the dorsalis pedis [Oriented x3] : oriented to person, place, and time [Diminished Throughout Both Feet] : normal tactile sensation with monofilament testing throughout both feet

## 2024-04-02 NOTE — ASSESSMENT
[FreeTextEntry1] : 63 year old female with past medical history of Diabetes Mellitus Type 2, Current Smoker who presents for management of her diabetes  1. DM 2- controlled, uncomplicated Patient counseled on the importance of diabetic control and risk of complications. We discussed about microvascular disease and macrovascular disease. We discussed the importance of ophthalmology evaluations annually or more frequent as necessary. We also discussed the importance of diabetes foot care. Some form of glucose monitoring is always advised. Maintaining a low carbohydrate/ADA diet and physical activity was discussed. Patient's diet and the necessary changes discussed. Reviewed over glycemic goals. Discussed other options for diabetes control.   Cont Metformin 1000 mg BID Cont Glipizide 2.5 mg QD Cont ADA diet Cont exercise Will refer to CDE/RD Labs UTD Opthalmology Visit needs to establish Foot Exam up to date   2. HLD- stable Cont Rosuvastatin

## 2024-05-19 ENCOUNTER — RX RENEWAL (OUTPATIENT)
Age: 64
End: 2024-05-19

## 2024-05-19 RX ORDER — GLIPIZIDE 5 MG/1
5 TABLET ORAL
Qty: 15 | Refills: 5 | Status: ACTIVE | COMMUNITY
Start: 2023-12-14 | End: 1900-01-01

## 2024-06-26 ENCOUNTER — RX RENEWAL (OUTPATIENT)
Age: 64
End: 2024-06-26

## 2024-06-26 RX ORDER — ALBUTEROL SULFATE 90 UG/1
108 (90 BASE) INHALANT RESPIRATORY (INHALATION)
Qty: 1 | Refills: 3 | Status: ACTIVE | COMMUNITY
Start: 1900-01-01 | End: 1900-01-01

## 2024-08-19 ENCOUNTER — APPOINTMENT (OUTPATIENT)
Dept: INTERNAL MEDICINE | Facility: CLINIC | Age: 64
End: 2024-08-19
Payer: COMMERCIAL

## 2024-08-19 ENCOUNTER — NON-APPOINTMENT (OUTPATIENT)
Age: 64
End: 2024-08-19

## 2024-08-19 VITALS
TEMPERATURE: 98.4 F | SYSTOLIC BLOOD PRESSURE: 150 MMHG | BODY MASS INDEX: 36.69 KG/M2 | HEIGHT: 59 IN | DIASTOLIC BLOOD PRESSURE: 80 MMHG | OXYGEN SATURATION: 97 % | HEART RATE: 97 BPM | WEIGHT: 182 LBS

## 2024-08-19 DIAGNOSIS — Z13.820 ENCOUNTER FOR SCREENING FOR OSTEOPOROSIS: ICD-10-CM

## 2024-08-19 DIAGNOSIS — L98.9 DISORDER OF THE SKIN AND SUBCUTANEOUS TISSUE, UNSPECIFIED: ICD-10-CM

## 2024-08-19 DIAGNOSIS — I10 ESSENTIAL (PRIMARY) HYPERTENSION: ICD-10-CM

## 2024-08-19 DIAGNOSIS — R59.0 LOCALIZED ENLARGED LYMPH NODES: ICD-10-CM

## 2024-08-19 DIAGNOSIS — F17.200 NICOTINE DEPENDENCE, UNSPECIFIED, UNCOMPLICATED: ICD-10-CM

## 2024-08-19 DIAGNOSIS — Z00.00 ENCOUNTER FOR GENERAL ADULT MEDICAL EXAMINATION W/OUT ABNORMAL FINDINGS: ICD-10-CM

## 2024-08-19 DIAGNOSIS — D56.1 BETA THALASSEMIA: ICD-10-CM

## 2024-08-19 PROCEDURE — 93000 ELECTROCARDIOGRAM COMPLETE: CPT

## 2024-08-19 PROCEDURE — 99396 PREV VISIT EST AGE 40-64: CPT

## 2024-08-19 NOTE — HEALTH RISK ASSESSMENT
[Very Good] : ~his/her~  mood as very good [Intercurrent Urgi Care visits] : went to urgent care [Yes] : Yes [Monthly or less (1 pt)] : Monthly or less (1 point) [1 or 2 (0 pts)] : 1 or 2 (0 points) [Never (0 pts)] : Never (0 points) [No] : In the past 12 months have you used drugs other than those required for medical reasons? No [0] : 2) Feeling down, depressed, or hopeless: Not at all (0) [PHQ-2 Negative - No further assessment needed] : PHQ-2 Negative - No further assessment needed [Current] : Current [20 or more] : 20 or more [NO] : No [HIV Test offered] : HIV Test offered [Hepatitis C test offered] : Hepatitis C test offered [Alone] : lives alone [Single] : single [Fully functional (bathing, dressing, toileting, transferring, walking, feeding)] : Fully functional (bathing, dressing, toileting, transferring, walking, feeding) [Fully functional (using the telephone, shopping, preparing meals, housekeeping, doing laundry, using] : Fully functional and needs no help or supervision to perform IADLs (using the telephone, shopping, preparing meals, housekeeping, doing laundry, using transportation, managing medications and managing finances) [No falls in past year] : Patient reported no falls in the past year [Patient reported mammogram was normal] : Patient reported mammogram was normal [FreeTextEntry1] : heart fluttering, weight gain [de-identified] : Endocrinology [Audit-CScore] : 1 [de-identified] : Does Not Exercise [de-identified] : no special diet [[id number]] : 0 [EyeExamDate] : many years ago [Sexually Active] : not sexually active [Reports changes in hearing] : Reports no changes in hearing [Reports changes in vision] : Reports no changes in vision [Reports normal functional visual acuity (ie: able to read med bottle)] : Reports poor functional visual acuity.  [Reports changes in dental health] : Reports no changes in dental health [MammogramComments] : July 2022 [ColonoscopyComments] : never [FreeTextEntry2] :

## 2024-08-19 NOTE — PLAN
[FreeTextEntry1] : #Health Maintenance - Comprehensive bloodwork to include B12, folate, and iron studies - Gynecology follow up, Rx provided for breast imaging and bone density screening - Annual dermatology, ophthalmology, and dental exams - ADA diet and exercise - Immunizations as scheduled - Recommended colonoscopy for colon cancer screening  #Smoker - Smoking Cessation - Low Dose CT chest for lung cancer screening  #Accelrated HTN #HLD #Chest Fluttering - EKG obtained in office today, NSR, no acute ischemic changes - Recommend cardiology evaluation - Resume Crestor 5mg daily - Lifestyle modifications to reduce risk (weight loss, diet, smoking cessation)  #Lesion on Nose - Dermatology referral  #Lymphadenopathy - US soft tissue bilateral submental lymph nodes

## 2024-08-19 NOTE — HISTORY OF PRESENT ILLNESS
[FreeTextEntry1] : Annual Wellness Visit [de-identified] : 64F w/ PMHx of Asthma, Active Nicotine Smoker 1.5 packs a day x 40 years, DM2, GERD, Obesity, and HLD presents to office today for an annual wellness examination. She reports feeling well and offers no complaints.  Pt with DM2, follows with endocrinology, Dr. Buchanan she is maintained on Metformin and Glipizide. She has recently gained 11 pounds. Most recent A1c 6.3.  BP today, 150/80, pt reports smoking and drinking large cup of coffee prior to visit. Pt reports 1 episode of chest fluttering last week, no associated symptoms except for anxiety, no further episodes or history of dysrhythmias.   The patient reports a crusty, red lesion to the tip of her nose, she has not followed up with dermatology in many years.   The patient has not seen an ophthalmologist, gynecologist, or dentist in many years. She has never had a colonoscopy and does not want one.   The patient has bilateral submental painless mild lymphadenopathy L>R. She denies any recent illness.

## 2024-08-19 NOTE — PHYSICAL EXAM
[No Acute Distress] : no acute distress [Normal Sclera/Conjunctiva] : normal sclera/conjunctiva [EOMI] : extraocular movements intact [Normal Outer Ear/Nose] : the outer ears and nose were normal in appearance [Normal Oropharynx] : the oropharynx was normal [Normal TMs] : both tympanic membranes were normal [No JVD] : no jugular venous distention [Supple] : supple [No Respiratory Distress] : no respiratory distress  [No Accessory Muscle Use] : no accessory muscle use [Clear to Auscultation] : lungs were clear to auscultation bilaterally [Normal Rate] : normal rate  [Regular Rhythm] : with a regular rhythm [Normal S1, S2] : normal S1 and S2 [No Edema] : there was no peripheral edema [No Extremity Clubbing/Cyanosis] : no extremity clubbing/cyanosis [Soft] : abdomen soft [Non Tender] : non-tender [Non-distended] : non-distended [Normal Anterior Cervical Nodes] : no anterior cervical lymphadenopathy [No CVA Tenderness] : no CVA  tenderness [No Joint Swelling] : no joint swelling [Grossly Normal Strength/Tone] : grossly normal strength/tone [No Rash] : no rash [No Focal Deficits] : no focal deficits [Normal Gait] : normal gait [Normal Affect] : the affect was normal [Normal Insight/Judgement] : insight and judgment were intact [de-identified] : Bilateral submental lymphadenopathy  [de-identified] : lesion tip of nose, dry, crusty

## 2024-08-19 NOTE — REVIEW OF SYSTEMS
[Recent Change In Weight] : ~T recent weight change [Palpitations] : palpitations [Cough] : cough [Dyspnea on Exertion] : dyspnea on exertion [Joint Pain] : joint pain [Mole Changes] : mole changes [Swollen Glands] : swollen glands [Fever] : no fever [Chills] : no chills [Fatigue] : no fatigue [Night Sweats] : no night sweats [Discharge] : no discharge [Pain] : no pain [Redness] : no redness [Dryness] : no dryness  [Vision Problems] : no vision problems [Itching] : no itching [Earache] : no earache [Hearing Loss] : no hearing loss [Nosebleed] : no nosebleeds [Hoarseness] : no hoarseness [Nasal Discharge] : no nasal discharge [Sore Throat] : no sore throat [Postnasal Drip] : no postnasal drip [Chest Pain] : no chest pain [Lower Ext Edema] : no lower extremity edema [Orthopnea] : no orthopnea [Paroxysmal Nocturnal Dyspnea] : no paroxysmal nocturnal dyspnea [Shortness Of Breath] : no shortness of breath [Wheezing] : no wheezing [Abdominal Pain] : no abdominal pain [Nausea] : no nausea [Constipation] : no constipation [Diarrhea] : diarrhea [Vomiting] : no vomiting [Heartburn] : no heartburn [Melena] : no melena [Dysuria] : no dysuria [Hematuria] : no hematuria [Muscle Pain] : no muscle pain [Itching] : no itching [Skin Rash] : no skin rash [Headache] : no headache [Dizziness] : no dizziness [Fainting] : no fainting [Insomnia] : no insomnia [Anxiety] : no anxiety [Depression] : no depression [Easy Bruising] : no easy bruising [FreeTextEntry2] : 11 pounds [FreeTextEntry5] : 1 episode of fluttering in the chest [FreeTextEntry6] : chronic [FreeTextEntry9] : chronic [de-identified] : tip of nose [de-identified] : bilateral submental

## 2024-08-21 ENCOUNTER — APPOINTMENT (OUTPATIENT)
Dept: ENDOCRINOLOGY | Facility: CLINIC | Age: 64
End: 2024-08-21
Payer: COMMERCIAL

## 2024-08-21 VITALS
OXYGEN SATURATION: 98 % | SYSTOLIC BLOOD PRESSURE: 130 MMHG | DIASTOLIC BLOOD PRESSURE: 78 MMHG | HEART RATE: 78 BPM | BODY MASS INDEX: 35.95 KG/M2 | WEIGHT: 178 LBS

## 2024-08-21 DIAGNOSIS — E11.9 TYPE 2 DIABETES MELLITUS W/OUT COMPLICATIONS: ICD-10-CM

## 2024-08-21 DIAGNOSIS — E78.00 PURE HYPERCHOLESTEROLEMIA, UNSPECIFIED: ICD-10-CM

## 2024-08-21 LAB — HBA1C MFR BLD HPLC: 5.9

## 2024-08-21 PROCEDURE — 83036 HEMOGLOBIN GLYCOSYLATED A1C: CPT | Mod: QW

## 2024-08-21 PROCEDURE — 99214 OFFICE O/P EST MOD 30 MIN: CPT | Mod: 25

## 2024-08-21 NOTE — HISTORY OF PRESENT ILLNESS
[FreeTextEntry1] : Ms. SHELTON GOINS  is a 63 year old female with past medical history of Diabetes Mellitus Type 2, Current Smoker who presents for management of her diabetes. Patient has been gaining weight but she also has been non-compliant with diet.  POCT Glucose:  mg/dL POCT Hga1c: 5.9%   Diabetes first diagnosed:less then 10 years Type:2  Current diabetic regimen: Metformin 1000 mg twice a day Glipizide 2.5 mg QD Janumet (stomach pain, abdominal pain) Other relevant medications:  Self monitoring blood glucose : Does not monitor  Hypoglycemia:  Diet: Breakfast: coffee with sugar and half and half Lunch: pasta, chicken, fish, red meat Dinner: pasta, chicken, fish, red meat Snacks: ice cream, cake  Exercise:  Urine micro: lipid profile: (3/2023), last hba1c:6.3% (4/2024), 7.5% (12/2023) eye exam:none diabetic foot exam/podiatry:na

## 2024-08-23 ENCOUNTER — APPOINTMENT (OUTPATIENT)
Dept: ULTRASOUND IMAGING | Facility: CLINIC | Age: 64
End: 2024-08-23

## 2024-08-23 ENCOUNTER — APPOINTMENT (OUTPATIENT)
Dept: RADIOLOGY | Facility: CLINIC | Age: 64
End: 2024-08-23

## 2024-08-23 ENCOUNTER — RX RENEWAL (OUTPATIENT)
Age: 64
End: 2024-08-23

## 2024-08-23 ENCOUNTER — APPOINTMENT (OUTPATIENT)
Dept: MAMMOGRAPHY | Facility: CLINIC | Age: 64
End: 2024-08-23

## 2024-08-29 ENCOUNTER — APPOINTMENT (OUTPATIENT)
Dept: MAMMOGRAPHY | Facility: HOSPITAL | Age: 64
End: 2024-08-29

## 2024-09-09 ENCOUNTER — NON-APPOINTMENT (OUTPATIENT)
Age: 64
End: 2024-09-09

## 2024-09-09 RX ORDER — ERGOCALCIFEROL 1.25 MG/1
1.25 MG CAPSULE ORAL
Qty: 8 | Refills: 0 | Status: ACTIVE | COMMUNITY
Start: 2024-09-09 | End: 1900-01-01

## 2024-09-13 ENCOUNTER — RX RENEWAL (OUTPATIENT)
Age: 64
End: 2024-09-13

## 2024-10-28 ENCOUNTER — APPOINTMENT (OUTPATIENT)
Dept: INTERNAL MEDICINE | Facility: CLINIC | Age: 64
End: 2024-10-28
Payer: COMMERCIAL

## 2024-10-28 VITALS
SYSTOLIC BLOOD PRESSURE: 136 MMHG | RESPIRATION RATE: 18 BRPM | WEIGHT: 179.13 LBS | HEART RATE: 89 BPM | TEMPERATURE: 98.4 F | OXYGEN SATURATION: 95 % | BODY MASS INDEX: 36.11 KG/M2 | DIASTOLIC BLOOD PRESSURE: 72 MMHG | HEIGHT: 59 IN

## 2024-10-28 DIAGNOSIS — J45.40 MODERATE PERSISTENT ASTHMA, UNCOMPLICATED: ICD-10-CM

## 2024-10-28 DIAGNOSIS — U07.1 COVID-19: ICD-10-CM

## 2024-10-28 DIAGNOSIS — F17.200 NICOTINE DEPENDENCE, UNSPECIFIED, UNCOMPLICATED: ICD-10-CM

## 2024-10-28 LAB
FLUAV SPEC QL CULT: NORMAL
FLUBV AG SPEC QL IA: NORMAL
SARS-COV-2 AG RESP QL IA.RAPID: POSITIVE

## 2024-10-28 PROCEDURE — 99215 OFFICE O/P EST HI 40 MIN: CPT

## 2024-10-28 PROCEDURE — 87804 INFLUENZA ASSAY W/OPTIC: CPT | Mod: QW

## 2024-10-28 PROCEDURE — 87811 SARS-COV-2 COVID19 W/OPTIC: CPT | Mod: QW

## 2024-10-28 RX ORDER — PROMETHAZINE HYDROCHLORIDE AND DEXTROMETHORPHAN HYDROBROMIDE ORAL SOLUTION 15; 6.25 MG/5ML; MG/5ML
6.25-15 SOLUTION ORAL
Qty: 300 | Refills: 0 | Status: ACTIVE | COMMUNITY
Start: 2024-10-28 | End: 1900-01-01

## 2024-10-28 RX ORDER — BUDESONIDE 0.5 MG/2ML
0.5 INHALANT ORAL
Qty: 180 | Refills: 3 | Status: ACTIVE | COMMUNITY
Start: 2024-10-28 | End: 1900-01-01

## 2024-10-28 RX ORDER — IPRATROPIUM BROMIDE AND ALBUTEROL SULFATE 2.5; .5 MG/3ML; MG/3ML
0.5-2.5 (3) SOLUTION RESPIRATORY (INHALATION) TWICE DAILY
Qty: 180 | Refills: 3 | Status: ACTIVE | COMMUNITY
Start: 2024-10-28 | End: 1900-01-01

## 2024-10-28 RX ORDER — AZITHROMYCIN 500 MG/1
500 TABLET, FILM COATED ORAL DAILY
Qty: 5 | Refills: 0 | Status: ACTIVE | COMMUNITY
Start: 2024-10-28 | End: 1900-01-01

## 2024-10-28 RX ORDER — PREDNISONE 20 MG/1
20 TABLET ORAL
Qty: 21 | Refills: 0 | Status: ACTIVE | COMMUNITY
Start: 2024-10-28 | End: 1900-01-01

## 2024-10-29 LAB
RAPID RVP RESULT: DETECTED
RV+EV RNA NPH QL NAA+NON-PROBE: DETECTED
SARS-COV-2 RNA NPH QL NAA+NON-PROBE: NOT DETECTED

## 2024-11-11 ENCOUNTER — RX RENEWAL (OUTPATIENT)
Age: 64
End: 2024-11-11

## 2024-11-25 ENCOUNTER — RX RENEWAL (OUTPATIENT)
Age: 64
End: 2024-11-25

## 2024-12-26 ENCOUNTER — APPOINTMENT (OUTPATIENT)
Dept: ENDOCRINOLOGY | Facility: CLINIC | Age: 64
End: 2024-12-26
Payer: COMMERCIAL

## 2024-12-26 VITALS
SYSTOLIC BLOOD PRESSURE: 130 MMHG | HEIGHT: 59 IN | OXYGEN SATURATION: 97 % | BODY MASS INDEX: 35.68 KG/M2 | HEART RATE: 81 BPM | WEIGHT: 177 LBS | DIASTOLIC BLOOD PRESSURE: 80 MMHG

## 2024-12-26 DIAGNOSIS — E11.9 TYPE 2 DIABETES MELLITUS W/OUT COMPLICATIONS: ICD-10-CM

## 2024-12-26 DIAGNOSIS — E78.00 PURE HYPERCHOLESTEROLEMIA, UNSPECIFIED: ICD-10-CM

## 2024-12-26 LAB — HBA1C MFR BLD HPLC: 6.6

## 2024-12-26 PROCEDURE — 99214 OFFICE O/P EST MOD 30 MIN: CPT

## 2024-12-26 PROCEDURE — 83036 HEMOGLOBIN GLYCOSYLATED A1C: CPT | Mod: QW

## 2024-12-27 ENCOUNTER — APPOINTMENT (OUTPATIENT)
Dept: INTERNAL MEDICINE | Facility: CLINIC | Age: 64
End: 2024-12-27

## 2025-01-09 ENCOUNTER — RX RENEWAL (OUTPATIENT)
Age: 65
End: 2025-01-09

## 2025-03-03 ENCOUNTER — RX RENEWAL (OUTPATIENT)
Age: 65
End: 2025-03-03

## 2025-03-14 ENCOUNTER — RX RENEWAL (OUTPATIENT)
Age: 65
End: 2025-03-14

## 2025-05-15 ENCOUNTER — RX RENEWAL (OUTPATIENT)
Age: 65
End: 2025-05-15

## 2025-06-12 ENCOUNTER — RX RENEWAL (OUTPATIENT)
Age: 65
End: 2025-06-12

## 2025-06-16 ENCOUNTER — NON-APPOINTMENT (OUTPATIENT)
Age: 65
End: 2025-06-16

## 2025-06-18 ENCOUNTER — APPOINTMENT (OUTPATIENT)
Dept: ENDOCRINOLOGY | Facility: CLINIC | Age: 65
End: 2025-06-18
Payer: COMMERCIAL

## 2025-06-18 ENCOUNTER — RESULT CHARGE (OUTPATIENT)
Age: 65
End: 2025-06-18

## 2025-06-18 VITALS
WEIGHT: 179 LBS | BODY MASS INDEX: 36.08 KG/M2 | SYSTOLIC BLOOD PRESSURE: 120 MMHG | HEIGHT: 59 IN | HEART RATE: 100 BPM | OXYGEN SATURATION: 97 % | DIASTOLIC BLOOD PRESSURE: 78 MMHG

## 2025-06-18 PROCEDURE — G2211 COMPLEX E/M VISIT ADD ON: CPT | Mod: NC

## 2025-06-18 PROCEDURE — 83036 HEMOGLOBIN GLYCOSYLATED A1C: CPT | Mod: QW

## 2025-06-18 PROCEDURE — 99214 OFFICE O/P EST MOD 30 MIN: CPT

## 2025-08-18 ENCOUNTER — RX RENEWAL (OUTPATIENT)
Age: 65
End: 2025-08-18

## 2025-08-26 ENCOUNTER — APPOINTMENT (OUTPATIENT)
Dept: INTERNAL MEDICINE | Facility: CLINIC | Age: 65
End: 2025-08-26
Payer: COMMERCIAL

## 2025-08-26 VITALS
WEIGHT: 179 LBS | OXYGEN SATURATION: 97 % | HEART RATE: 92 BPM | HEIGHT: 59 IN | SYSTOLIC BLOOD PRESSURE: 150 MMHG | TEMPERATURE: 98 F | RESPIRATION RATE: 16 BRPM | BODY MASS INDEX: 36.08 KG/M2 | DIASTOLIC BLOOD PRESSURE: 90 MMHG

## 2025-08-26 DIAGNOSIS — Z23 ENCOUNTER FOR IMMUNIZATION: ICD-10-CM

## 2025-08-26 DIAGNOSIS — F17.200 NICOTINE DEPENDENCE, UNSPECIFIED, UNCOMPLICATED: ICD-10-CM

## 2025-08-26 DIAGNOSIS — Z00.00 ENCOUNTER FOR GENERAL ADULT MEDICAL EXAMINATION W/OUT ABNORMAL FINDINGS: ICD-10-CM

## 2025-08-26 DIAGNOSIS — E11.9 TYPE 2 DIABETES MELLITUS W/OUT COMPLICATIONS: ICD-10-CM

## 2025-08-26 PROCEDURE — 99406 BEHAV CHNG SMOKING 3-10 MIN: CPT | Mod: 25

## 2025-08-26 PROCEDURE — 99396 PREV VISIT EST AGE 40-64: CPT | Mod: 25

## 2025-08-26 PROCEDURE — G0009: CPT

## 2025-08-26 PROCEDURE — 90684 PCV21 VACCINE IM: CPT

## 2025-08-26 PROCEDURE — 93000 ELECTROCARDIOGRAM COMPLETE: CPT

## 2025-08-27 ENCOUNTER — NON-APPOINTMENT (OUTPATIENT)
Age: 65
End: 2025-08-27

## 2025-08-28 LAB
25(OH)D3 SERPL-MCNC: 28 NG/ML
ALBUMIN SERPL ELPH-MCNC: 4.3 G/DL
ALP BLD-CCNC: 98 U/L
ALT SERPL-CCNC: 15 U/L
ANION GAP SERPL CALC-SCNC: 14 MMOL/L
AST SERPL-CCNC: 15 U/L
BILIRUB SERPL-MCNC: 0.2 MG/DL
BUN SERPL-MCNC: 19 MG/DL
CALCIUM SERPL-MCNC: 9.5 MG/DL
CHLORIDE SERPL-SCNC: 104 MMOL/L
CHOLEST SERPL-MCNC: 139 MG/DL
CO2 SERPL-SCNC: 22 MMOL/L
CREAT SERPL-MCNC: 0.84 MG/DL
EGFRCR SERPLBLD CKD-EPI 2021: 78 ML/MIN/1.73M2
ESTIMATED AVERAGE GLUCOSE: 154 MG/DL
GLUCOSE SERPL-MCNC: 163 MG/DL
HBA1C MFR BLD HPLC: 7 %
HCT VFR BLD CALC: 39.1 %
HDLC SERPL-MCNC: 53 MG/DL
HGB BLD-MCNC: 11.8 G/DL
LDLC SERPL-MCNC: 71 MG/DL
MCHC RBC-ENTMCNC: 22.1 PG
MCHC RBC-ENTMCNC: 30.2 G/DL
MCV RBC AUTO: 73.2 FL
NONHDLC SERPL-MCNC: 86 MG/DL
PLATELET # BLD AUTO: 457 K/UL
POTASSIUM SERPL-SCNC: 5.4 MMOL/L
PROT SERPL-MCNC: 6.5 G/DL
RBC # BLD: 5.34 M/UL
RBC # FLD: 16.4 %
SODIUM SERPL-SCNC: 140 MMOL/L
T3FREE SERPL-MCNC: 2.78 PG/ML
T4 FREE SERPL-MCNC: 1.1 NG/DL
TRIGL SERPL-MCNC: 76 MG/DL
TSH SERPL-ACNC: 1.28 UIU/ML
WBC # FLD AUTO: 7.84 K/UL